# Patient Record
Sex: MALE | Race: WHITE | NOT HISPANIC OR LATINO
[De-identification: names, ages, dates, MRNs, and addresses within clinical notes are randomized per-mention and may not be internally consistent; named-entity substitution may affect disease eponyms.]

---

## 2020-07-05 ENCOUNTER — TRANSCRIPTION ENCOUNTER (OUTPATIENT)
Age: 45
End: 2020-07-05

## 2021-08-19 ENCOUNTER — EMERGENCY (EMERGENCY)
Facility: HOSPITAL | Age: 46
LOS: 1 days | Discharge: HOME | End: 2021-08-19
Attending: INTERNAL MEDICINE | Admitting: INTERNAL MEDICINE
Payer: COMMERCIAL

## 2021-08-19 VITALS
SYSTOLIC BLOOD PRESSURE: 128 MMHG | DIASTOLIC BLOOD PRESSURE: 85 MMHG | TEMPERATURE: 98 F | HEART RATE: 79 BPM | WEIGHT: 240.08 LBS | HEIGHT: 73 IN | OXYGEN SATURATION: 97 % | RESPIRATION RATE: 17 BRPM

## 2021-08-19 PROCEDURE — 99283 EMERGENCY DEPT VISIT LOW MDM: CPT | Mod: 25

## 2021-08-19 PROCEDURE — 29125 APPL SHORT ARM SPLINT STATIC: CPT

## 2021-08-19 PROCEDURE — 73140 X-RAY EXAM OF FINGER(S): CPT

## 2021-08-19 PROCEDURE — 73140 X-RAY EXAM OF FINGER(S): CPT | Mod: 26,RT

## 2021-08-19 PROCEDURE — 29125 APPL SHORT ARM SPLINT STATIC: CPT | Mod: RT

## 2021-08-19 NOTE — ED PROVIDER NOTE - CLINICAL SUMMARY MEDICAL DECISION MAKING FREE TEXT BOX
46 y/o M with no reported PMH presents to the ED with c/o right thumb pain s/p injury yesterday. pt states he grabbed a bag of rocks and hyperextended his finger. he applied ice last night which helped. he denies f/c, crush injury, paresthesias or all other complaints. XR images reviewed by Dr. Murray- no fx. pt refused pain meds. thumb spica placed for pain control. will dc with plastics f/u

## 2021-08-19 NOTE — ED PROVIDER NOTE - PHYSICAL EXAMINATION
msk: +mild TTP over the right MCP joint. no snuff box TTP. FROM of fingers. good cap refill  5/5 strength in the finger   no neurovasc compromise on exam   no swelling or obvious deformity noted

## 2021-08-19 NOTE — ED PROVIDER NOTE - ATTENDING CONTRIBUTION TO CARE
46 y/o M with no reported PMH presents to the ED with c/o right thumb pain s/p injury yesterday. pt states he grabbed a bag of rocks and hyperextended his finger. he applied ice last night which helped. he denies f/c, crush injury, paresthesias or all other complaints. XR images reviewed by Dr. Murray- no fx. pt refused pain meds. thumb spica placed for pain control. will dc with plastics f/u  Dr. Murray:  I have reviewed and discussed with the PA/ resident the case specifics, including the history, physical assessment, evaluation, conclusion, laboratory results, and medical plan. I agree with the contents, and conclusions. I have personally examined, and interviewed the patient.

## 2021-08-19 NOTE — ED PROVIDER NOTE - NSFOLLOWUPINSTRUCTIONS_ED_ALL_ED_FT
Follow up with the hand specialist within 1 week.     Rest, Ice 15 min on/ 15 min off, Elevate and keep compression of affected area.     Take Ibuprofen 600mg Orally every 6 hours as needed for pain.    Stay hydrated    Return to the ER if your symptoms worsen or for any other medical emergencies  **********      Finger Sprain    WHAT YOU NEED TO KNOW:    A finger sprain happens when ligaments in your finger or thumb are stretched or torn. Ligaments are the tough tissues that connect bones. Ligaments allow your hands to grasp and pinch.    DISCHARGE INSTRUCTIONS:    Return to the emergency department if:   •The skin on your injured finger looks bluish or pale (less color than normal).      •You have new weakness or numbness in your finger or thumb. It may tingle or burn.      •You have a splint that you cannot adjust and it feels too tight.      Call your doctor if:   •You have new or increased swelling or pain in your finger.      •You have new or increased stiffness when you move your injured finger.      •You have questions or concerns about your injury or treatment.      Medicines:   •Prescription pain medicine may be given. Ask your healthcare provider how to take this medicine safely. Some prescription pain medicines contain acetaminophen. Do not take other medicines that contain acetaminophen without talking to your healthcare provider. Too much acetaminophen may cause liver damage. Prescription pain medicine may cause constipation. Ask your healthcare provider how to prevent or treat constipation.       •Take your medicine as directed. Contact your healthcare provider if you think your medicine is not helping or if you have side effects. Tell him or her if you are allergic to any medicine. Keep a list of the medicines, vitamins, and herbs you take. Include the amounts, and when and why you take them. Bring the list or the pill bottles to follow-up visits. Carry your medicine list with you in case of an emergency.      Care for your finger:   •Rest your finger for at least 48 hours. Do not do activities that cause pain. Return to normal activities as directed.      •Apply ice on your finger to help decrease pain and swelling. Use an ice pack, or put crushed ice in a plastic bag. Cover the bag with a towel before you place it on your finger. Apply ice every hour for 15 to 20 minutes at a time. You may need to apply ice at least 4 to 8 times each day. Continue for as many days as directed.      •Elevate (raise) your finger above the level of your heart as often as you can. This will help decrease swelling and pain. You can elevate your hand by resting it on a pillow.             •Use a splint or compression as directed. Compression (tight hold) helps support your finger or thumb as it heals. Tape your injured finger to the finger beside it. Severe sprains may be treated with a splint. A splint prevents your finger from moving while it heals. Ask how long you must wear the splint or tape, and how to apply them.      •Do exercises as directed. You may be given gentle exercises to begin in a few days. Exercises can help decrease stiffness in your finger or thumb. Exercises also help decrease pain and swelling and improve the movement of your finger or thumb. Check with your healthcare provider before you return to your normal activities or sports.      Follow up with your doctor as directed: Write down any questions you may have to ask at your follow up visits.

## 2021-08-19 NOTE — ED PROVIDER NOTE - CARE PROVIDER_API CALL
Aaron Styles)  Plastic Surgery  [address] [place] [address], [address] [place] [id number]  Phone: [phone]  Fax: [phone]  Follow Up Time:

## 2021-08-19 NOTE — ED PROVIDER NOTE - PATIENT PORTAL LINK FT
You can access the FollowMyHealth Patient Portal offered by [facility] by registering at the following website: [email or web address]. By joining [employer]’s FollowMyHealth portal, you will also be able to view your health information using other applications (apps) compatible with our system.

## 2021-08-19 NOTE — ED PROVIDER NOTE - OBJECTIVE STATEMENT
46 y/o M with no reported PMH presents to the ED with c/o right thumb pain s/p injury yesterday. pt states he grabbed a bag of rocks and hyperextended his finger. he applied ice last night which helped. he denies f/c, crush injury, paresthesias or all other complaints.

## 2021-08-19 NOTE — ED ADULT NURSE NOTE - NSIMPLEMENTINTERV_GEN_ALL_ED
Implemented All Universal Safety Interventions:  [place] to call system. Call bell, personal items and telephone within reach. Instruct patient to call for assistance. Room bathroom lighting operational. Non-slip footwear when patient is off stretcher. Physically safe environment: no spills, clutter or unnecessary equipment. Stretcher in lowest position, wheels locked, appropriate side rails in place.

## 2021-11-03 ENCOUNTER — TRANSCRIPTION ENCOUNTER (OUTPATIENT)
Age: 46
End: 2021-11-03

## 2021-11-03 ENCOUNTER — INPATIENT (INPATIENT)
Facility: HOSPITAL | Age: 46
LOS: 0 days | Discharge: HOME | End: 2021-11-04
Attending: ORAL & MAXILLOFACIAL SURGERY | Admitting: ORAL & MAXILLOFACIAL SURGERY
Payer: COMMERCIAL

## 2021-11-03 VITALS
SYSTOLIC BLOOD PRESSURE: 143 MMHG | TEMPERATURE: 97 F | RESPIRATION RATE: 16 BRPM | HEIGHT: 73 IN | OXYGEN SATURATION: 100 % | DIASTOLIC BLOOD PRESSURE: 91 MMHG | HEART RATE: 79 BPM

## 2021-11-03 DIAGNOSIS — K04.7 PERIAPICAL ABSCESS WITHOUT SINUS: ICD-10-CM

## 2021-11-03 DIAGNOSIS — Z01.818 ENCOUNTER FOR OTHER PREPROCEDURAL EXAMINATION: ICD-10-CM

## 2021-11-03 DIAGNOSIS — Z91.89 OTHER SPECIFIED PERSONAL RISK FACTORS, NOT ELSEWHERE CLASSIFIED: ICD-10-CM

## 2021-11-03 LAB
ALBUMIN SERPL ELPH-MCNC: 3.9 G/DL — SIGNIFICANT CHANGE UP (ref 3.3–5)
ALP SERPL-CCNC: 72 U/L — SIGNIFICANT CHANGE UP (ref 40–120)
ALT FLD-CCNC: 20 U/L — SIGNIFICANT CHANGE UP (ref 4–41)
ANION GAP SERPL CALC-SCNC: 8 MMOL/L — SIGNIFICANT CHANGE UP (ref 7–14)
APTT BLD: 30 SEC — SIGNIFICANT CHANGE UP (ref 27–36.3)
AST SERPL-CCNC: 15 U/L — SIGNIFICANT CHANGE UP (ref 4–40)
B PERT DNA SPEC QL NAA+PROBE: SIGNIFICANT CHANGE UP
B PERT+PARAPERT DNA PNL SPEC NAA+PROBE: SIGNIFICANT CHANGE UP
BASOPHILS # BLD AUTO: 0.06 K/UL — SIGNIFICANT CHANGE UP (ref 0–0.2)
BASOPHILS NFR BLD AUTO: 0.8 % — SIGNIFICANT CHANGE UP (ref 0–2)
BILIRUB SERPL-MCNC: 0.6 MG/DL — SIGNIFICANT CHANGE UP (ref 0.2–1.2)
BLD GP AB SCN SERPL QL: NEGATIVE — SIGNIFICANT CHANGE UP
BORDETELLA PARAPERTUSSIS (RAPRVP): SIGNIFICANT CHANGE UP
BUN SERPL-MCNC: 6 MG/DL — LOW (ref 7–23)
C PNEUM DNA SPEC QL NAA+PROBE: SIGNIFICANT CHANGE UP
CALCIUM SERPL-MCNC: 9.2 MG/DL — SIGNIFICANT CHANGE UP (ref 8.4–10.5)
CHLORIDE SERPL-SCNC: 98 MMOL/L — SIGNIFICANT CHANGE UP (ref 98–107)
CO2 SERPL-SCNC: 30 MMOL/L — SIGNIFICANT CHANGE UP (ref 22–31)
CREAT SERPL-MCNC: 0.79 MG/DL — SIGNIFICANT CHANGE UP (ref 0.5–1.3)
EOSINOPHIL # BLD AUTO: 0.17 K/UL — SIGNIFICANT CHANGE UP (ref 0–0.5)
EOSINOPHIL NFR BLD AUTO: 2.3 % — SIGNIFICANT CHANGE UP (ref 0–6)
FLUAV SUBTYP SPEC NAA+PROBE: SIGNIFICANT CHANGE UP
FLUBV RNA SPEC QL NAA+PROBE: SIGNIFICANT CHANGE UP
GLUCOSE SERPL-MCNC: 108 MG/DL — HIGH (ref 70–99)
HADV DNA SPEC QL NAA+PROBE: SIGNIFICANT CHANGE UP
HCOV 229E RNA SPEC QL NAA+PROBE: SIGNIFICANT CHANGE UP
HCOV HKU1 RNA SPEC QL NAA+PROBE: SIGNIFICANT CHANGE UP
HCOV NL63 RNA SPEC QL NAA+PROBE: SIGNIFICANT CHANGE UP
HCOV OC43 RNA SPEC QL NAA+PROBE: SIGNIFICANT CHANGE UP
HCT VFR BLD CALC: 38.9 % — LOW (ref 39–50)
HGB BLD-MCNC: 13.1 G/DL — SIGNIFICANT CHANGE UP (ref 13–17)
HMPV RNA SPEC QL NAA+PROBE: SIGNIFICANT CHANGE UP
HPIV1 RNA SPEC QL NAA+PROBE: SIGNIFICANT CHANGE UP
HPIV2 RNA SPEC QL NAA+PROBE: SIGNIFICANT CHANGE UP
HPIV3 RNA SPEC QL NAA+PROBE: SIGNIFICANT CHANGE UP
HPIV4 RNA SPEC QL NAA+PROBE: SIGNIFICANT CHANGE UP
IANC: 5.74 K/UL — SIGNIFICANT CHANGE UP (ref 1.5–8.5)
IMM GRANULOCYTES NFR BLD AUTO: 0.5 % — SIGNIFICANT CHANGE UP (ref 0–1.5)
INR BLD: 1.13 RATIO — SIGNIFICANT CHANGE UP (ref 0.88–1.16)
LYMPHOCYTES # BLD AUTO: 0.78 K/UL — LOW (ref 1–3.3)
LYMPHOCYTES # BLD AUTO: 10.4 % — LOW (ref 13–44)
M PNEUMO DNA SPEC QL NAA+PROBE: SIGNIFICANT CHANGE UP
MCHC RBC-ENTMCNC: 30.4 PG — SIGNIFICANT CHANGE UP (ref 27–34)
MCHC RBC-ENTMCNC: 33.7 GM/DL — SIGNIFICANT CHANGE UP (ref 32–36)
MCV RBC AUTO: 90.3 FL — SIGNIFICANT CHANGE UP (ref 80–100)
MONOCYTES # BLD AUTO: 0.68 K/UL — SIGNIFICANT CHANGE UP (ref 0–0.9)
MONOCYTES NFR BLD AUTO: 9.1 % — SIGNIFICANT CHANGE UP (ref 2–14)
NEUTROPHILS # BLD AUTO: 5.74 K/UL — SIGNIFICANT CHANGE UP (ref 1.8–7.4)
NEUTROPHILS NFR BLD AUTO: 76.9 % — SIGNIFICANT CHANGE UP (ref 43–77)
NRBC # BLD: 0 /100 WBCS — SIGNIFICANT CHANGE UP
NRBC # FLD: 0 K/UL — SIGNIFICANT CHANGE UP
PLATELET # BLD AUTO: 176 K/UL — SIGNIFICANT CHANGE UP (ref 150–400)
POTASSIUM SERPL-MCNC: 3.8 MMOL/L — SIGNIFICANT CHANGE UP (ref 3.5–5.3)
POTASSIUM SERPL-SCNC: 3.8 MMOL/L — SIGNIFICANT CHANGE UP (ref 3.5–5.3)
PROT SERPL-MCNC: 6.7 G/DL — SIGNIFICANT CHANGE UP (ref 6–8.3)
PROTHROM AB SERPL-ACNC: 12.8 SEC — SIGNIFICANT CHANGE UP (ref 10.6–13.6)
RAPID RVP RESULT: SIGNIFICANT CHANGE UP
RBC # BLD: 4.31 M/UL — SIGNIFICANT CHANGE UP (ref 4.2–5.8)
RBC # FLD: 13.2 % — SIGNIFICANT CHANGE UP (ref 10.3–14.5)
RH IG SCN BLD-IMP: POSITIVE — SIGNIFICANT CHANGE UP
RSV RNA SPEC QL NAA+PROBE: SIGNIFICANT CHANGE UP
RV+EV RNA SPEC QL NAA+PROBE: SIGNIFICANT CHANGE UP
SARS-COV-2 RNA SPEC QL NAA+PROBE: SIGNIFICANT CHANGE UP
SODIUM SERPL-SCNC: 136 MMOL/L — SIGNIFICANT CHANGE UP (ref 135–145)
WBC # BLD: 7.47 K/UL — SIGNIFICANT CHANGE UP (ref 3.8–10.5)
WBC # FLD AUTO: 7.47 K/UL — SIGNIFICANT CHANGE UP (ref 3.8–10.5)

## 2021-11-03 PROCEDURE — 99223 1ST HOSP IP/OBS HIGH 75: CPT | Mod: GC

## 2021-11-03 PROCEDURE — 99285 EMERGENCY DEPT VISIT HI MDM: CPT

## 2021-11-03 PROCEDURE — 70487 CT MAXILLOFACIAL W/DYE: CPT | Mod: 26,MA

## 2021-11-03 RX ORDER — SODIUM CHLORIDE 9 MG/ML
1000 INJECTION, SOLUTION INTRAVENOUS
Refills: 0 | Status: DISCONTINUED | OUTPATIENT
Start: 2021-11-03 | End: 2021-11-04

## 2021-11-03 RX ORDER — OXYCODONE HYDROCHLORIDE 5 MG/1
10 TABLET ORAL EVERY 6 HOURS
Refills: 0 | Status: DISCONTINUED | OUTPATIENT
Start: 2021-11-03 | End: 2021-11-04

## 2021-11-03 RX ORDER — CHLORHEXIDINE GLUCONATE 213 G/1000ML
15 SOLUTION TOPICAL
Refills: 0 | Status: DISCONTINUED | OUTPATIENT
Start: 2021-11-03 | End: 2021-11-04

## 2021-11-03 RX ORDER — OXYCODONE HYDROCHLORIDE 5 MG/1
5 TABLET ORAL EVERY 6 HOURS
Refills: 0 | Status: DISCONTINUED | OUTPATIENT
Start: 2021-11-03 | End: 2021-11-04

## 2021-11-03 RX ORDER — SODIUM CHLORIDE 9 MG/ML
1000 INJECTION, SOLUTION INTRAVENOUS
Refills: 0 | Status: DISCONTINUED | OUTPATIENT
Start: 2021-11-03 | End: 2021-11-03

## 2021-11-03 RX ORDER — ENOXAPARIN SODIUM 100 MG/ML
40 INJECTION SUBCUTANEOUS EVERY 24 HOURS
Refills: 0 | Status: DISCONTINUED | OUTPATIENT
Start: 2021-11-03 | End: 2021-11-04

## 2021-11-03 RX ORDER — IBUPROFEN 200 MG
600 TABLET ORAL EVERY 6 HOURS
Refills: 0 | Status: DISCONTINUED | OUTPATIENT
Start: 2021-11-03 | End: 2021-11-04

## 2021-11-03 RX ORDER — AMPICILLIN SODIUM AND SULBACTAM SODIUM 250; 125 MG/ML; MG/ML
3 INJECTION, POWDER, FOR SUSPENSION INTRAMUSCULAR; INTRAVENOUS EVERY 6 HOURS
Refills: 0 | Status: DISCONTINUED | OUTPATIENT
Start: 2021-11-03 | End: 2021-11-04

## 2021-11-03 RX ORDER — ACETAMINOPHEN 500 MG
650 TABLET ORAL EVERY 6 HOURS
Refills: 0 | Status: DISCONTINUED | OUTPATIENT
Start: 2021-11-03 | End: 2021-11-04

## 2021-11-03 RX ADMIN — SODIUM CHLORIDE 75 MILLILITER(S): 9 INJECTION, SOLUTION INTRAVENOUS at 16:01

## 2021-11-03 RX ADMIN — ENOXAPARIN SODIUM 40 MILLIGRAM(S): 100 INJECTION SUBCUTANEOUS at 19:22

## 2021-11-03 RX ADMIN — CHLORHEXIDINE GLUCONATE 15 MILLILITER(S): 213 SOLUTION TOPICAL at 21:43

## 2021-11-03 RX ADMIN — OXYCODONE HYDROCHLORIDE 5 MILLIGRAM(S): 5 TABLET ORAL at 18:08

## 2021-11-03 RX ADMIN — AMPICILLIN SODIUM AND SULBACTAM SODIUM 200 GRAM(S): 250; 125 INJECTION, POWDER, FOR SUSPENSION INTRAMUSCULAR; INTRAVENOUS at 17:57

## 2021-11-03 RX ADMIN — AMPICILLIN SODIUM AND SULBACTAM SODIUM 200 GRAM(S): 250; 125 INJECTION, POWDER, FOR SUSPENSION INTRAMUSCULAR; INTRAVENOUS at 23:25

## 2021-11-03 RX ADMIN — SODIUM CHLORIDE 75 MILLILITER(S): 9 INJECTION, SOLUTION INTRAVENOUS at 19:21

## 2021-11-03 RX ADMIN — Medication 650 MILLIGRAM(S): at 21:43

## 2021-11-03 RX ADMIN — Medication 650 MILLIGRAM(S): at 23:46

## 2021-11-03 NOTE — ED PROVIDER NOTE - OBJECTIVE STATEMENT
45 yom with right lower face swelling and pain. Pt has had on/off pain in that area and was to get root canal but pain worsened over last three days. Pt seen at [place] 30 hours ago and had CT that shows "no drainable fluid collection." Pt was initially given Amox and Flagyl but changed to Zosyn in ED and then PO Augmentin. Pt taking narcotics and Motrin around the clock. No fever. Today swelling significantly worse but pain improved. No SOB, change in voice.

## 2021-11-03 NOTE — ED ADULT NURSE NOTE - CHIEF COMPLAINT QUOTE
Pt states that he has been having right sided tooth abscess for the past few days.  Pt states that he was in the ER at [employer] yesterday and was given IV abx and advised to come to this ER to see oral surgeon.  Pt states that he cannot get local anesthesia because it causes bradycardia.  Pt denies PMH

## 2021-11-03 NOTE — H&P ADULT - NSHPPHYSICALEXAM_GEN_ALL_CORE
Gen: AAox3, NAD, NC/AT  EOE: Lower R facial swelling w/ mild induration. Inferior border of mandible palpable throughout.   Intraoral Exam: MOUNIKA: ( 35mm  ) , Severe attrition, carious/ fractured distal #30. Fluctuant swelling in buccal vestibule #30 with spontaneous purulent drainage. occlusion stable and reproducible. B/l mandibular aleyda. FOM soft, NT, NE.

## 2021-11-03 NOTE — ED PROVIDER NOTE - CLINICAL SUMMARY MEDICAL DECISION MAKING FREE TEXT BOX
45 yom with dental abscess and significant facial edema concerning that increasing of antibxs that are appropriate - pt sent in to see OMFS Dr. Walls. Spoke with OMFS resident will get labs and repeat CT at their request. Pt aware to be prepped for possible OR.

## 2021-11-03 NOTE — CONSULT NOTE ADULT - ASSESSMENT
Assessment/Plan: 45yMale with R vestibular/ buccal space abscess 2/2 necrotic tooth #30. Scheduled for I&D, extraction in OR tomorrow.     - admit OMFS (Dr. Valente)  - unasyn 3g q6h   - Rec Pain Control  - Rec Peridex rinse BID x 2 weeks  - NPO midnight   - FLD   Case discussed with attending.

## 2021-11-03 NOTE — H&P ADULT - HISTORY OF PRESENT ILLNESS
Patient is a 45y old Male presents to the ED with chief complaint of right lower facial swelling that has worsened overnight. Pt reports sporadic dental pain and was to get a root canal but pain worsened over last three days and has since presented to the ED. Pt initially seen at [place] yesterday and had a CT that showed "no drainable fluid collection." Received Amox, flagyl then changed to zosyn. Presents to [facility] ED today for increase in facial swelling since yesterday ED visit. Denies f/n/v/c, SOB, chest pain. No difficulty breathing, swallowing, or tolerating own secretions.

## 2021-11-03 NOTE — ED ADULT NURSE NOTE - OBJECTIVE STATEMENT
Pt arriving to intake 10B A&XO4 ambulatory c/o R sided tooth infection. Pt seen at [facility] yesterday and received IV abx. Pt endorsing worsening swelling to R face. Airway patent. Resp even and unlabored. No drooling noted. Denies SOB. Verbalizing partial improvement in pain today. 20g IV placed in LAC. Labs drawn and sent. Awaiting CT.

## 2021-11-03 NOTE — ED PROVIDER NOTE - PHYSICAL EXAMINATION
face: significant edema over lateral face and submandibular area induration limited to lower mandible area. #30 with 2cm area of fluctuant swelling and mild erythema, tender.

## 2021-11-03 NOTE — CONSULT NOTE ADULT - SUBJECTIVE AND OBJECTIVE BOX
PAPITO CADE  45y  Male      Patient is a 45y old  Male who presents with a chief complaint of dental pain    46 yo M with no PMH presenting for acute worsening of 2 month long continued right lower mandibular pain 2/2 dental infection with associated swelling now being planned for OMFS surgery tomorrow.   Patient currently has no chest pain, has been very active with moderate exercise daily greater than 30 min in an attempt to lose weight and prepare for the incoming hockey season and has no difficulty walking up two flight of stairs. Denies SOB, GALE, palpitations, lightheadedness, history of blood clots, history of TIA/CVA/CAD or family history of early cardiac disease.  Patient does not take any standing medications prior to recent antibiotics and pain regimen.     Patient notes that he has an intolerance of lidocaine, novocaine both IV, IM and intraocular which causes him to become bradycardic and syncopize, which usually resolves on his own. Patient denies any rash, swelling or throat closing. Patient has obtained prior EKGs, ECHOs to evaluate this with a cardiologist who is also his PCP with normal results. Patient claims he has some level of vasovagal discovered after tilt table test. Patient does not have diabetes and has quit chewing tobacco. As an outpatient, patient usually takes valium elixir for local anesthesia and has underwent some procedures under general anesthesia without complications.     Pain is currently controlled      PAST MEDICAL & SURGICAL HISTORY:  No pertinent past medical history    No significant past surgical history: appendectomy, ORIF of left leg        REVIEW OF SYSTEMS:  CONSTITUTIONAL: No fever, weight loss, or fatigue  EYES: No eye pain, visual disturbances, or discharge  ENMT:  No difficulty hearing, tinnitus, vertigo; No sinus or throat pain  NECK: No pain or stiffness  BREASTS: No pain, masses, or nipple discharge  RESPIRATORY: No cough, wheezing, chills or hemoptysis; No shortness of breath  CARDIOVASCULAR: No chest pain, palpitations, dizziness, or leg swelling  GASTROINTESTINAL: No abdominal or epigastric pain. No nausea, vomiting, or hematemesis; No diarrhea or constipation. No melena or hematochezia.  GENITOURINARY: No dysuria, frequency, hematuria, or incontinence  NEUROLOGICAL: No headaches, memory loss, loss of strength, numbness, or tremors  SKIN: No itching, burning, rashes, or lesions   LYMPH NODES: No enlarged glands  ENDOCRINE: No heat or cold intolerance; No hair loss  MUSCULOSKELETAL: No joint pain or swelling; No muscle, back, or extremity pain  PSYCHIATRIC: No depression, anxiety, mood swings, or difficulty sleeping  HEME/LYMPH: No easy bruising, or bleeding gums  ALLERY AND IMMUNOLOGIC: No hives or eczema    T(C): 36.9 (11-03-21 @ 13:50), Max: 36.9 (11-03-21 @ 13:50)  HR: 78 (11-03-21 @ 13:50) (78 - 79)  BP: 132/70 (11-03-21 @ 13:50) (132/70 - 143/91)  RR: 17 (11-03-21 @ 13:50) (16 - 17)  SpO2: 100% (11-03-21 @ 13:50) (100% - 100%)  Wt(kg): --Vital Signs Last 24 Hrs  T(C): 36.9 (03 Nov 2021 13:50), Max: 36.9 (03 Nov 2021 13:50)  T(F): 98.5 (03 Nov 2021 13:50), Max: 98.5 (03 Nov 2021 13:50)  HR: 78 (03 Nov 2021 13:50) (78 - 79)  BP: 132/70 (03 Nov 2021 13:50) (132/70 - 143/91)  BP(mean): --  RR: 17 (03 Nov 2021 13:50) (16 - 17)  SpO2: 100% (03 Nov 2021 13:50) (100% - 100%)    PHYSICAL EXAM:  GENERAL: NAD, well-groomed, well-developed  HEAD:  Atraumatic, Normocephalic  EYES: EOMI, PERRLA, conjunctiva and sclera clear  ENMT: No tonsillar erythema, exudates, or enlargement; Moist mucous membranes, Good dentition, No lesions, right lower mandible swollen and tender with erythema   NECK: Supple, No JVD, Normal thyroid  NERVOUS SYSTEM:  Alert & Oriented X3, Good concentration; Motor Strength 5/5 B/L upper and lower extremities; DTRs 2+ intact and symmetric  CHEST/LUNG: Clear to percussion bilaterally; No rales, rhonchi, wheezing, or rubs  HEART: Regular rate and rhythm; No murmurs, rubs, or gallops  ABDOMEN: Soft, Nontender, Nondistended; Bowel sounds present  EXTREMITIES:  2+ Peripheral Pulses, No clubbing, cyanosis. left leg chronically more swollen than right due to past surgery  LYMPH: No lymphadenopathy noted  SKIN: No rashes or lesions    Consultant(s) Notes Reviewed:  [x ] YES  [ ] NO  Care Discussed with Consultants/Other Providers [ x] YES  [ ] NO    LABS:  CBC   11-03-21 @ 11:23  Hematcorit 38.9  Hemoglobin 13.1  Mean Cell Hemoglobin 30.4  Platelet Count-Automated 176  RBC Count 4.31  Red Cell Distrib Width 13.2  Wbc Count 7.47      BMP  11-03-21 @ 11:23  Anion Gap. Serum 8  Blood Urea Nitrogen,Serm 6  Calcium, Total Serum 9.2  Carbon Dioxide, Serum 30  Chloride, Serum 98  Creatinine, Serum 0.79  eGFR in  124  eGFR in Non Afican American 107  Gloucose, serum 108  Potassium, Serum 3.8  Sodium, Serum 136                  CMP  11-03-21 @ 11:23  Radha Aminotransferase(ALT/SGPT)20  Albumin, Serum 3.9  Alkaline Phosphatase, Serum 72  Anion Gap, Serum 8  Aspartate Aminotransferase (AST/SGOT)15  Bilirubin Total, Serum 0.6  Blood Urea Nitrogen, Serum 6  Calcium,Total Serum 9.2  Carbon Dioxide, Serum 30  Chloride, Serum 98  Creatinine, Serum 0.79  eGFR if  124  eGFR if Non African American 107  Glucose, Serum 108  Potassium, Serum 3.8  Protein Total, Serum 6.7  Sodium, Serum 136                          PT/INR  PT/INR  11-03-21 @ 11:23  INR 1.13  Prothrombin Time Comment --  Prothrobin Time, [id number]      Amylase/Lipase            RADIOLOGY & ADDITIONAL TESTS:    Imaging Personally Reviewed:  [ ] YES  [ ] NO PAPITO CADE  45y  Male      Patient is a 45y old  Male who presents with a chief complaint of dental pain    44 yo M with no PMH presenting for acute worsening of 2 month long continued right lower mandibular pain 2/2 dental infection with associated swelling now being planned for OMFS surgery tomorrow.   Patient currently has no chest pain, has been very active with moderate exercise daily greater than 30 min in an attempt to lose weight and prepare for the incoming hockey season and has no difficulty walking up two flight of stairs. Denies SOB, GALE, palpitations, lightheadedness, history of blood clots, history of TIA/CVA/CAD or family history of early cardiac disease.  Patient does not take any standing medications prior to recent antibiotics and pain regimen.     Patient notes that he has an intolerance of lidocaine, novocaine both IV, IM and intraocular which causes him to become bradycardic and syncopize, which usually resolves on his own. Patient denies any rash, swelling or throat closing. Patient has obtained prior EKGs, ECHOs to evaluate this with a cardiologist who is also his PCP with normal results. Patient claims he has some level of vasovagal discovered after tilt table test. Patient does not have diabetes and has quit chewing tobacco. As an outpatient, patient usually takes valium elixir for local anesthesia and has underwent some procedures under general anesthesia without complications.     Pain is currently controlled      PAST MEDICAL & SURGICAL HISTORY:  No pertinent past medical history    No significant past surgical history: appendectomy, ORIF of left leg        REVIEW OF SYSTEMS:  CONSTITUTIONAL: No fever, weight loss, or fatigue  EYES: No eye pain, visual disturbances, or discharge  ENMT:  No difficulty hearing, tinnitus, vertigo; No sinus or throat pain  NECK: No pain or stiffness  BREASTS: No pain, masses, or nipple discharge  RESPIRATORY: No cough, wheezing, chills or hemoptysis; No shortness of breath  CARDIOVASCULAR: No chest pain, palpitations, dizziness, or leg swelling  GASTROINTESTINAL: No abdominal or epigastric pain. No nausea, vomiting, or hematemesis; No diarrhea or constipation. No melena or hematochezia.  GENITOURINARY: No dysuria, frequency, hematuria, or incontinence  NEUROLOGICAL: No headaches, memory loss, loss of strength, numbness, or tremors  SKIN: No itching, burning, rashes, or lesions   LYMPH NODES: No enlarged glands  ENDOCRINE: No heat or cold intolerance; No hair loss  MUSCULOSKELETAL: No joint pain or swelling; No muscle, back, or extremity pain  PSYCHIATRIC: No depression, anxiety, mood swings, or difficulty sleeping  HEME/LYMPH: No easy bruising, or bleeding gums  ALLERY AND IMMUNOLOGIC: No hives or eczema    T(C): 36.9 (11-03-21 @ 13:50), Max: 36.9 (11-03-21 @ 13:50)  HR: 78 (11-03-21 @ 13:50) (78 - 79)  BP: 132/70 (11-03-21 @ 13:50) (132/70 - 143/91)  RR: 17 (11-03-21 @ 13:50) (16 - 17)  SpO2: 100% (11-03-21 @ 13:50) (100% - 100%)  Wt(kg): --Vital Signs Last 24 Hrs  T(C): 36.9 (03 Nov 2021 13:50), Max: 36.9 (03 Nov 2021 13:50)  T(F): 98.5 (03 Nov 2021 13:50), Max: 98.5 (03 Nov 2021 13:50)  HR: 78 (03 Nov 2021 13:50) (78 - 79)  BP: 132/70 (03 Nov 2021 13:50) (132/70 - 143/91)  BP(mean): --  RR: 17 (03 Nov 2021 13:50) (16 - 17)  SpO2: 100% (03 Nov 2021 13:50) (100% - 100%)    PHYSICAL EXAM:  GENERAL: NAD, well-groomed, well-developed  HEAD:  Atraumatic, Normocephalic  EYES: EOMI, PERRLA, conjunctiva and sclera clear  ENMT: No tonsillar erythema, exudates, or enlargement; Moist mucous membranes, Good dentition, No lesions, right lower mandible swollen and tender with erythema   NECK: Supple, No JVD, Normal thyroid  NERVOUS SYSTEM:  Alert & Oriented X3, Good concentration; Motor Strength 5/5 B/L upper and lower extremities; DTRs 2+ intact and symmetric  CHEST/LUNG: Clear to percussion bilaterally; No rales, rhonchi, wheezing, or rubs  HEART: Regular rate and rhythm; No murmurs, rubs, or gallops  ABDOMEN: Soft, Nontender, Nondistended; Bowel sounds present  EXTREMITIES:  2+ Peripheral Pulses, No clubbing, cyanosis. left leg chronically more swollen than right due to past surgery  LYMPH: No lymphadenopathy noted  SKIN: No rashes or lesions    Consultant(s) Notes Reviewed:  [x ] YES  [ ] NO  Care Discussed with Consultants/Other Providers [ x] YES  [ ] NO    LABS:  CBC   11-03-21 @ 11:23  Hematcorit 38.9  Hemoglobin 13.1  Mean Cell Hemoglobin 30.4  Platelet Count-Automated 176  RBC Count 4.31  Red Cell Distrib Width 13.2  Wbc Count 7.47      BMP  11-03-21 @ 11:23  Anion Gap. Serum 8  Blood Urea Nitrogen,Serm 6  Calcium, Total Serum 9.2  Carbon Dioxide, Serum 30  Chloride, Serum 98  Creatinine, Serum 0.79  eGFR in  124  eGFR in Non Afican American 107  Gloucose, serum 108  Potassium, Serum 3.8  Sodium, Serum 136                  CMP  11-03-21 @ 11:23  Radha Aminotransferase(ALT/SGPT)20  Albumin, Serum 3.9  Alkaline Phosphatase, Serum 72  Anion Gap, Serum 8  Aspartate Aminotransferase (AST/SGOT)15  Bilirubin Total, Serum 0.6  Blood Urea Nitrogen, Serum 6  Calcium,Total Serum 9.2  Carbon Dioxide, Serum 30  Chloride, Serum 98  Creatinine, Serum 0.79  eGFR if  124  eGFR if Non African American 107  Glucose, Serum 108  Potassium, Serum 3.8  Protein Total, Serum 6.7  Sodium, Serum 136                          PT/INR  PT/INR  11-03-21 @ 11:23  INR 1.13  Prothrombin Time Comment --  Prothrobin Time, [id number]      Amylase/Lipase            RADIOLOGY & ADDITIONAL TESTS:    Imaging Personally Reviewed:  [x] YES  [ ] NO    < from: CT Maxillofacial w/ IV Cont (11.03.21 @ 13:26) >  EXAM:  CT MAXILLOFACIAL  IC        PROCEDURE DATE:  Nov  3 2021         INTERPRETATION:  .    CLINICAL INFORMATION: Right-sided dental infection.    TECHNIQUE: Axial CT images were obtained through the paranasal sinuses, and orbits. 90 cc's of IV Omnipaque-350  was administered without immediate complication and 10 cc's was discarded. Coronal and sagittal reconstruction images were also obtained.    COMPARISON: None available.    FINDINGS: There is a carious right-sided first mandibular molar tooth with the cavity affecting the posterior aspect of the crown. An additional carious right-sided maxillary first molar tooth is also seen within the cavity affecting the anterior aspect of the crown. There is adjacent right-sided perimandibular soft tissue swelling, edema, and hyperenhancement with reticulation of the subcutaneous fat. There is adjacent gingival inflammation as well. There is associated myositis involving the right masseter and buccinator muscles as well as the platysma muscle.No well-formed drainable collection is seen to suggest abscess formation. There is no floor of mouth abscess. There is no overt evidence of mandibular osteomyelitis. There is an additional carious left-sided first mandibular molar tooth with nonspecific surrounding periapical radiolucency about the roots. Multiple bulky lingual and buccal surface mandibular aleyda are notable.    Multiple enlarged bilateral cervical lymph nodes are seen which are reactive.    There is fatty infiltration of the bilateral parotid glands. The submandibular glands unremarkable. The partially imaged thyroid gland appears within normal limits.    The neck vessels are patent.    The bilateral orbits inclusive of the globes, extraocular muscles, optic nerves, and orbital fat appear within normal limits.    Minor scattered mucosal thickening is seen throughout the ethmoid sinuses. Otherwise, the paranasal sinuses and mastoid air cells are clear.    The posterior arch of C1 remains unfused.    The imaged intracranialstructures appear grossly unremarkable.    IMPRESSION: Carious right-sided first mandibular and maxillary molar teeth with associated right-sided perimandibular soft tissue swelling and edema most compatible with cellulitis/phlegmon formation. No well-formed drainable collection to suggest abscess formation.    Additional carious left-sided mandibular first molar tooth without surrounding inflammatory changes.    Reactive bilateral cervical lymphadenopathy.    < end of copied text >    EKG : NSR rate 79

## 2021-11-03 NOTE — ED ADULT TRIAGE NOTE - CHIEF COMPLAINT QUOTE
Pt states that he has been having right sided tooth abscess for the past few days.  Pt states that he was in the ER at [place] yesterday and was given IV abx and advised to come to this ER to see oral surgeon.  Pt states that he cannot get local anesthesia because it causes bradycardia.  Pt denies PMH

## 2021-11-03 NOTE — CONSULT NOTE ADULT - SUBJECTIVE AND OBJECTIVE BOX
FS Consult Note   #[id number]     Patient is a 45y old Male presents to the ED with chief complaint of right lower facial swelling that has worsened overnight. Pt reports sporadic dental pain and was to get a root canal but pain worsened over last three days and has since presented to the ED. Pt initially seen at [facility] yesterday and had a CT that showed "no drainable fluid collection." Received Amox, flagyl then changed to zosyn. Presents to [facility] ED today for increase in facial swelling since yesterday ED visit. Denies f/n/v/c, SPOB, chest pain. No difficulty breathing, swallowing, or tolerating own secretions.       PAST MEDICAL & SURGICAL HISTORY:  No pertinent past medical history    No significant past surgical history      MEDICATIONS  (STANDING):  ampicillin/sulbactam  IVPB 3 Gram(s) IV Intermittent every 6 hours  chlorhexidine 0.12% Liquid 15 milliLiter(s) Oral Mucosa two times a day  dextrose 5% + lactated ringers. 1000 milliLiter(s) (75 mL/Hr) IV Continuous <Continuous>  enoxaparin Injectable 40 milliGRAM(s) SubCutaneous every 24 hours    MEDICATIONS  (PRN):  acetaminophen    Suspension .. 650 milliGRAM(s) Oral every 6 hours PRN Mild Pain (1 - 3)  ibuprofen  Suspension. 600 milliGRAM(s) Oral every 6 hours PRN Mild Pain (1 - 3)  oxyCODONE    Solution 5 milliGRAM(s) Oral every 6 hours PRN Moderate Pain (4 - 6)  oxyCODONE    Solution 10 milliGRAM(s) Oral every 6 hours PRN Severe Pain (7 - 10)    Allergies    lidocaine (Other)    Intolerances      FAMILY HISTORY:    *SOCIAL HISTORY: Denies ETOH use, Tobacco, drugs      Vital Signs Last 24 Hrs  T(C): 36.9 (03 Nov 2021 13:50), Max: 36.9 (03 Nov 2021 13:50)  T(F): 98.5 (03 Nov 2021 13:50), Max: 98.5 (03 Nov 2021 13:50)  HR: 78 (03 Nov 2021 13:50) (78 - 79)  BP: 132/70 (03 Nov 2021 13:50) (132/70 - 143/91)  BP(mean): --  RR: 17 (03 Nov 2021 13:50) (16 - 17)  SpO2: 100% (03 Nov 2021 13:50) (100% - 100%)    Physical Exam:  Gen: AAox3, NAD, NC/AT  EOE: Lower R facial swelling w/ mild induration. Inferior border of mandible palpable throughout.   Intraoral Exam: MOUNIKA: ( 35mm  ) , Severe attrition, carious/ fractured distal #30. Fluctuant swelling in buccal vestibule #30 with spontaneous purulent drainage. occlusion stable and reproducible. B/l mandibular aleyda. FOM soft, NT, NE.     LABS:                        13.1   7.47  )-----------( 176      ( 03 Nov 2021 11:23 )             38.9     11-03    136  |  98  |  6<L>  ----------------------------<  108<H>  3.8   |  30  |  0.79    Ca    9.2      03 Nov 2021 11:23    TPro  6.7  /  Alb  3.9  /  TBili  0.6  /  DBili  x   /  AST  15  /  ALT  20  /  AlkPhos  72  11-03      PT/INR - ( 03 Nov 2021 11:23 )   PT: 12.8 sec;   INR: 1.13 ratio         PTT - ( 03 Nov 2021 11:23 )  PTT:30.0 sec        CT Maxillofacial:   FINDINGS: There is a carious right-sided first mandibular molar tooth with the cavity affecting the posterior aspect of the crown. An additional carious right-sided maxillary first molar tooth is also seen within the cavity affecting the anterior aspect of the crown. There is adjacent right-sided perimandibular soft tissue swelling, edema, and hyperenhancement with reticulation of the subcutaneous fat. There is adjacent gingival inflammation as well. There is associated myositis involving the right masseter and buccinator muscles as well as the platysma muscle. No well-formed drainable collection is seen to suggest abscess formation. There is no floor of mouth abscess. There is no overt evidence of mandibular osteomyelitis. There is an additional carious left-sided first mandibular molar tooth with nonspecific surrounding periapical radiolucency about the roots. Multiple bulky lingual and buccal surface mandibular aleyda are notable.    Multiple enlarged bilateral cervical lymph nodes are seen which are reactive.    There is fatty infiltration of the bilateral parotid glands. The submandibular glands unremarkable. The partially imaged thyroid gland appears within normal limits.    The neck vessels are patent.    The bilateral orbits inclusive of the globes, extraocular muscles, optic nerves, and orbital fat appear within normal limits.    Minor scattered mucosal thickening is seen throughout the ethmoid sinuses. Otherwise, the paranasal sinuses and mastoid air cells are clear.    The posterior arch of C1 remains unfused.    The imaged intracranial structures appear grossly unremarkable.    IMPRESSION: Carious right-sided first mandibular and maxillary molar teeth with associated right-sided perimandibular soft tissue swelling and edema most compatible with cellulitis/phlegmon formation. No well-formed drainable collection to suggest abscess formation.    Additional carious left-sided mandibular first molar tooth without surrounding inflammatory changes.    Reactive bilateral cervical lymphadenopathy.

## 2021-11-03 NOTE — ED PROVIDER NOTE - RESPIRATORY, MLM
Breath sounds clear and equal bilaterally. [No Acute Distress] : no acute distress [Alert] : alert [Normocephalic] : normocephalic [EOMI] : EOMI [Clear TM bilaterally] : clear tympanic membranes bilaterally [Pink Nasal Mucosa] : pink nasal mucosa [Erythematous Oropharynx] : erythematous oropharynx [Supple] : supple [Clear to Ausculatation Bilaterally] : clear to auscultation bilaterally [Regular Rate and Rhythm] : regular rate and rhythm [Soft] : soft [NonTender] : non tender [No Abnormal Lymph Nodes Palpated] : no abnormal lymph nodes palpated [Moves All Extremities x 4] : moves all extremities x4 [Normotonic] : normotonic [Warm] : warm

## 2021-11-03 NOTE — CONSULT NOTE ADULT - ASSESSMENT
44 yo M with no PMH presenting for acute worsening of 2 month long continued right lower mandibular pain 2/2 dental infection with associated swelling now being planned for OMFS surgery tomorrow. Medicine consulted for pre op risk stratification and medical optimization    #risk stratification  RCRI score  46 yo M with no PMH presenting for acute worsening of 2 month long continued right lower mandibular pain 2/2 dental infection with associated swelling now being planned for OMFS surgery tomorrow. Medicine consulted for pre op risk stratification and medical optimization    #risk stratification  patient requiring urgent procedure, no active CP or signs of ischemia on EKG, can tolerate greater than METS>10, euvolemic, not warranting further preop evaluation  RCRI score 0  with regards to anesthesia should avoid any -kaitlynn medications given intolerance and bradycardia even to topical anesthtic dosing however will defer to anesthesia on day of operation  continue IV abx with broad coverage will defer to OMFS  pain control per primary team    thank you for the consult

## 2021-11-03 NOTE — H&P ADULT - ASSESSMENT
45yMale with R vestibular/ buccal space abscess 2/2 necrotic tooth #30. Scheduled for I&D, extraction in OR tomorrow.

## 2021-11-03 NOTE — CONSULT NOTE ADULT - ATTENDING COMMENTS
45M with no cardiac history presented for dental abscess requiring surgical drainage.     Patient has a perculiar history of adverse reaction to -James meds which resulted in syncopal episodes. Patient's history of his reaction appears more related to vasovagal, with questionable psychosomatic etiology.    his EKG with nsr. non-specific TWI in lead III. patient states he had eval with is cardiology/PCP and had negative cardiac workup in the past. He has no acute chest pain and denied exertional dyspnea, cp or syncope.     patient is low risk for low risk procedure. no medical contraindication for surgery. avoid lidocaine related analgesics.

## 2021-11-04 ENCOUNTER — TRANSCRIPTION ENCOUNTER (OUTPATIENT)
Age: 46
End: 2021-11-04

## 2021-11-04 ENCOUNTER — RESULT REVIEW (OUTPATIENT)
Age: 46
End: 2021-11-04

## 2021-11-04 VITALS
RESPIRATION RATE: 16 BRPM | TEMPERATURE: 99 F | OXYGEN SATURATION: 99 % | HEART RATE: 79 BPM | SYSTOLIC BLOOD PRESSURE: 127 MMHG | DIASTOLIC BLOOD PRESSURE: 91 MMHG

## 2021-11-04 LAB
GRAM STN FLD: SIGNIFICANT CHANGE UP
SPECIMEN SOURCE: SIGNIFICANT CHANGE UP

## 2021-11-04 PROCEDURE — 88305 TISSUE EXAM BY PATHOLOGIST: CPT | Mod: 26

## 2021-11-04 RX ORDER — HYDROMORPHONE HYDROCHLORIDE 2 MG/ML
1 INJECTION INTRAMUSCULAR; INTRAVENOUS; SUBCUTANEOUS
Refills: 0 | Status: DISCONTINUED | OUTPATIENT
Start: 2021-11-04 | End: 2021-11-04

## 2021-11-04 RX ORDER — CHLORHEXIDINE GLUCONATE 213 G/1000ML
15 SOLUTION TOPICAL
Qty: 300 | Refills: 0
Start: 2021-11-04 | End: 2021-11-13

## 2021-11-04 RX ORDER — HYDROMORPHONE HYDROCHLORIDE 2 MG/ML
0.5 INJECTION INTRAMUSCULAR; INTRAVENOUS; SUBCUTANEOUS
Refills: 0 | Status: DISCONTINUED | OUTPATIENT
Start: 2021-11-04 | End: 2021-11-04

## 2021-11-04 RX ORDER — ONDANSETRON 8 MG/1
4 TABLET, FILM COATED ORAL ONCE
Refills: 0 | Status: DISCONTINUED | OUTPATIENT
Start: 2021-11-04 | End: 2021-11-04

## 2021-11-04 RX ORDER — OXYCODONE HYDROCHLORIDE 5 MG/1
1 TABLET ORAL
Qty: 12 | Refills: 0
Start: 2021-11-04 | End: 2021-11-07

## 2021-11-04 RX ADMIN — HYDROMORPHONE HYDROCHLORIDE 1 MILLIGRAM(S): 2 INJECTION INTRAMUSCULAR; INTRAVENOUS; SUBCUTANEOUS at 12:16

## 2021-11-04 RX ADMIN — CHLORHEXIDINE GLUCONATE 15 MILLILITER(S): 213 SOLUTION TOPICAL at 05:52

## 2021-11-04 RX ADMIN — HYDROMORPHONE HYDROCHLORIDE 1 MILLIGRAM(S): 2 INJECTION INTRAMUSCULAR; INTRAVENOUS; SUBCUTANEOUS at 12:27

## 2021-11-04 RX ADMIN — AMPICILLIN SODIUM AND SULBACTAM SODIUM 200 GRAM(S): 250; 125 INJECTION, POWDER, FOR SUSPENSION INTRAMUSCULAR; INTRAVENOUS at 12:18

## 2021-11-04 RX ADMIN — OXYCODONE HYDROCHLORIDE 10 MILLIGRAM(S): 5 TABLET ORAL at 12:31

## 2021-11-04 RX ADMIN — AMPICILLIN SODIUM AND SULBACTAM SODIUM 200 GRAM(S): 250; 125 INJECTION, POWDER, FOR SUSPENSION INTRAMUSCULAR; INTRAVENOUS at 05:52

## 2021-11-04 RX ADMIN — HYDROMORPHONE HYDROCHLORIDE 1 MILLIGRAM(S): 2 INJECTION INTRAMUSCULAR; INTRAVENOUS; SUBCUTANEOUS at 11:51

## 2021-11-04 NOTE — DISCHARGE NOTE PROVIDER - NSDCCPTREATMENT_GEN_ALL_CORE_FT
PRINCIPAL PROCEDURE  Procedure: Incision and drainage, mandible  Findings and Treatment:       SECONDARY PROCEDURE  Procedure: Surgical removal of tooth  Findings and Treatment:

## 2021-11-04 NOTE — DISCHARGE NOTE NURSING/CASE MANAGEMENT/SOCIAL WORK - NSDCPNINST_GEN_ALL_CORE
Because you had anesthesia for the next 24 hours you should not drive a car, operate power tools or machinery, drink alcohol beer, or wine. Do not make any important personal or business decisions. You may experience lightheadedness, dizziness or sleepiness following the procedure a responsible adult should stay with you for 24 hours. Call your doctor for any bleeding that does not stop, pain not relieved by medications, fever greater than 101 F, signs symptoms of infection redness, swellling or foul smelling discharge. In the event that you develop a complication and you are unable to reach your own doctor you may contact [facility] [phone] M-F 6A-11P, [facility] [phone] 24/7 or [facility] [phone] 24/7. Do not drive if taking pain medications. No heavy lifting.  Because you had anesthesia for the next 24 hours you should not drive a car, operate power tools or machinery, drink alcohol beer, or wine. Do not make any important personal or business decisions. You may experience lightheadedness, dizziness or sleepiness following the procedure a responsible adult should stay with you for 24 hours. Call your doctor for any bleeding that does not stop, pain not relieved by medications, fever greater than 101 F, signs symptoms of infection redness, swellling or foul smelling discharge. In the event that you develop a complication and you are unable to reach your own doctor you may contact [facility] [phone] M-F 6A-11P, [facility] [phone] 24/7 or [facility] [phone] 24/7. Do not drive if taking pain medications. No heavy lifting.   no tylenol or tylenol containing products till 4:45 pm   no motrin till 5:30 pm

## 2021-11-04 NOTE — PROGRESS NOTE ADULT - SUBJECTIVE AND OBJECTIVE BOX
OMFS Progress Note   #[id number]     STATUS POST:      POST OPERATIVE DAY #      INTERVAL EVENTS/SUBJECTIVE:     ______________________________________________  OBJECTIVE:   T(C): 36.7 (11-04-21 @ 05:53), Max: 37.1 (11-03-21 @ 20:07)  HR: 99 (11-04-21 @ 05:53) (78 - 99)  BP: 123/73 (11-04-21 @ 05:53) (123/73 - 144/82)  RR: 18 (11-04-21 @ 05:53) (16 - 18)  SpO2: 99% (11-04-21 @ 05:53) (97% - 100%)  Wt(kg): --  CAPILLARY BLOOD GLUCOSE        I&O's Detail      Physical exam:  Gen: NAD  Abdomen:  Vascular:  ______________________________________________  LABS:  CBC Full  -  ( 03 Nov 2021 11:23 )  WBC Count : 7.47 K/uL  RBC Count : 4.31 M/uL  Hemoglobin : 13.1 g/dL  Hematocrit : 38.9 %  Platelet Count - Automated : 176 K/uL  Mean Cell Volume : 90.3 fL  Mean Cell Hemoglobin : 30.4 pg  Mean Cell Hemoglobin Concentration : 33.7 gm/dL  Auto Neutrophil # : 5.74 K/uL  Auto Lymphocyte # : 0.78 K/uL  Auto Monocyte # : 0.68 K/uL  Auto Eosinophil # : 0.17 K/uL  Auto Basophil # : 0.06 K/uL  Auto Neutrophil % : 76.9 %  Auto Lymphocyte % : 10.4 %  Auto Monocyte % : 9.1 %  Auto Eosinophil % : 2.3 %  Auto Basophil % : 0.8 %    11-03    136  |  98  |  6<L>  ----------------------------<  108<H>  3.8   |  30  |  0.79    Ca    9.2      03 Nov 2021 11:23    TPro  6.7  /  Alb  3.9  /  TBili  0.6  /  DBili  x   /  AST  15  /  ALT  20  /  AlkPhos  72  11-03    _____________________________________________  RADIOLOGY:

## 2021-11-04 NOTE — DISCHARGE NOTE PROVIDER - HOSPITAL COURSE
45M with history of lidocaine hypersensitivity presents to [facility] with significant right facial swelling related to tooth #30 with ongoing pain for approximately 1 week. No dysphagia, dysphonia related to facial swelling. Hemodynamically stable and satting well on room air. Patient was taken to the OR on 11/4/21 with Dr. Valente for incision and drainage of right buccal vestibule abscess, culture and sensitivity of expressed fluid, surgical removal of tooth #30, and histologic analysis of buccal soft tissue specimen. Patient to be discharged to home on oral antibiotics, Peridex, and pain medication. Patient to follow up at [facility] Oral [facility] Maxillofacial surgery clinic in 1 week with Dr. Valente.

## 2021-11-04 NOTE — DISCHARGE NOTE PROVIDER - CARE PROVIDER_API CALL
Chalo Valente (DDS; MD)  OralMaxillofacial Surgery  [address] [place] [address]  Phone: [phone]  Fax: [phone]  Follow Up Time: 1 week

## 2021-11-04 NOTE — PRE-OP CHECKLIST - AS BP NONINV SITE
Pt listed in observation status; scheduled to discharge home today. Pt to be transported home by spouse.   PCP appt made for May 7th, 2019 @ 10:15 am. 
 
SUKHI Sommer 
 left upper arm

## 2021-11-04 NOTE — DISCHARGE NOTE PROVIDER - NSDCMRMEDTOKEN_GEN_ALL_CORE_FT
amoxicillin-clavulanate 875 mg-125 mg oral tablet: 1 milligram(s) orally every 12 hours MDD:2 tablets  oxyCODONE 5 mg oral tablet: 1 tab(s) orally every 8 hours MDD:4 tabs  Peridex 0.12% mucous membrane liquid: 15 milliliter(s) orally 2 times a day MDD:30 mL

## 2021-11-04 NOTE — DISCHARGE NOTE NURSING/CASE MANAGEMENT/SOCIAL WORK - PATIENT PORTAL LINK FT
You can access the FollowMyHealth Patient Portal offered by [facility] by registering at the following website: [email or web address]. By joining [employer]’s FollowMyHealth portal, you will also be able to view your health information using other applications (apps) compatible with our system.

## 2021-11-04 NOTE — DISCHARGE NOTE PROVIDER - NSDCFUADDINST_GEN_ALL_CORE_FT
Please follow up with Dr. Valente at [facility] at the Department of Dental Medicine in 1 week. Take you prescribed medications as instructed.

## 2021-11-08 LAB — SURGICAL PATHOLOGY STUDY: SIGNIFICANT CHANGE UP

## 2021-11-09 LAB
CULTURE RESULTS: SIGNIFICANT CHANGE UP
SPECIMEN SOURCE: SIGNIFICANT CHANGE UP

## 2021-12-21 ENCOUNTER — TRANSCRIPTION ENCOUNTER (OUTPATIENT)
Age: 46
End: 2021-12-21

## 2021-12-21 ENCOUNTER — OUTPATIENT (OUTPATIENT)
Dept: OUTPATIENT SERVICES | Facility: HOSPITAL | Age: 46
LOS: 1 days | End: 2021-12-21
Payer: COMMERCIAL

## 2021-12-21 DIAGNOSIS — Z20.828 CONTACT WITH AND (SUSPECTED) EXPOSURE TO OTHER VIRAL COMMUNICABLE DISEASES: ICD-10-CM

## 2021-12-21 LAB — SARS-COV-2 RNA SPEC QL NAA+PROBE: SIGNIFICANT CHANGE UP

## 2021-12-21 PROCEDURE — U0005: CPT

## 2021-12-21 PROCEDURE — U0003: CPT

## 2021-12-22 ENCOUNTER — OUTPATIENT (OUTPATIENT)
Dept: OUTPATIENT SERVICES | Facility: HOSPITAL | Age: 46
LOS: 1 days | End: 2021-12-22
Payer: COMMERCIAL

## 2021-12-22 DIAGNOSIS — Z12.11 ENCOUNTER FOR SCREENING FOR MALIGNANT NEOPLASM OF COLON: ICD-10-CM

## 2021-12-22 PROCEDURE — 45378 DIAGNOSTIC COLONOSCOPY: CPT

## 2021-12-22 RX ORDER — SODIUM CHLORIDE 9 MG/ML
500 INJECTION INTRAMUSCULAR; INTRAVENOUS; SUBCUTANEOUS
Refills: 0 | Status: DISCONTINUED | OUTPATIENT
Start: 2021-12-22 | End: 2022-01-06

## 2022-06-06 ENCOUNTER — EMERGENCY (EMERGENCY)
Facility: HOSPITAL | Age: 47
LOS: 1 days | Discharge: HOME | End: 2022-06-06
Attending: EMERGENCY MEDICINE | Admitting: EMERGENCY MEDICINE
Payer: COMMERCIAL

## 2022-06-06 VITALS
RESPIRATION RATE: 15 BRPM | OXYGEN SATURATION: 99 % | DIASTOLIC BLOOD PRESSURE: 76 MMHG | SYSTOLIC BLOOD PRESSURE: 117 MMHG | HEART RATE: 90 BPM

## 2022-06-06 VITALS
OXYGEN SATURATION: 97 % | HEART RATE: 96 BPM | HEIGHT: 73 IN | TEMPERATURE: 98 F | WEIGHT: 229.94 LBS | RESPIRATION RATE: 18 BRPM | SYSTOLIC BLOOD PRESSURE: 128 MMHG | DIASTOLIC BLOOD PRESSURE: 88 MMHG

## 2022-06-06 LAB
ALBUMIN SERPL ELPH-MCNC: 3.8 G/DL — SIGNIFICANT CHANGE UP (ref 3.3–5)
ALP SERPL-CCNC: 73 U/L — SIGNIFICANT CHANGE UP (ref 40–120)
ALT FLD-CCNC: 40 U/L — SIGNIFICANT CHANGE UP (ref 10–45)
ANION GAP SERPL CALC-SCNC: 7 MMOL/L — SIGNIFICANT CHANGE UP (ref 5–17)
AST SERPL-CCNC: 22 U/L — SIGNIFICANT CHANGE UP (ref 10–40)
BASOPHILS # BLD AUTO: 0.09 K/UL — SIGNIFICANT CHANGE UP (ref 0–0.2)
BASOPHILS NFR BLD AUTO: 0.8 % — SIGNIFICANT CHANGE UP (ref 0–2)
BILIRUB SERPL-MCNC: 0.5 MG/DL — SIGNIFICANT CHANGE UP (ref 0.2–1.2)
BUN SERPL-MCNC: 13 MG/DL — SIGNIFICANT CHANGE UP (ref 7–23)
CALCIUM SERPL-MCNC: 8.8 MG/DL — SIGNIFICANT CHANGE UP (ref 8.4–10.5)
CHLORIDE SERPL-SCNC: 105 MMOL/L — SIGNIFICANT CHANGE UP (ref 96–108)
CO2 SERPL-SCNC: 29 MMOL/L — SIGNIFICANT CHANGE UP (ref 22–31)
CREAT SERPL-MCNC: 1.24 MG/DL — SIGNIFICANT CHANGE UP (ref 0.5–1.3)
D DIMER BLD IA.RAPID-MCNC: <150 NG/ML DDU — SIGNIFICANT CHANGE UP
EGFR: 71 ML/MIN/1.73M2 — SIGNIFICANT CHANGE UP
EOSINOPHIL # BLD AUTO: 0.38 K/UL — SIGNIFICANT CHANGE UP (ref 0–0.5)
EOSINOPHIL NFR BLD AUTO: 3.4 % — SIGNIFICANT CHANGE UP (ref 0–6)
GLUCOSE SERPL-MCNC: 116 MG/DL — HIGH (ref 70–99)
HCT VFR BLD CALC: 41.6 % — SIGNIFICANT CHANGE UP (ref 39–50)
HGB BLD-MCNC: 14.5 G/DL — SIGNIFICANT CHANGE UP (ref 13–17)
IMM GRANULOCYTES NFR BLD AUTO: 0.4 % — SIGNIFICANT CHANGE UP (ref 0–1.5)
LIDOCAIN IGE QN: 74 U/L — SIGNIFICANT CHANGE UP (ref 73–393)
LYMPHOCYTES # BLD AUTO: 1.6 K/UL — SIGNIFICANT CHANGE UP (ref 1–3.3)
LYMPHOCYTES # BLD AUTO: 14.3 % — SIGNIFICANT CHANGE UP (ref 13–44)
MCHC RBC-ENTMCNC: 31 PG — SIGNIFICANT CHANGE UP (ref 27–34)
MCHC RBC-ENTMCNC: 34.9 GM/DL — SIGNIFICANT CHANGE UP (ref 32–36)
MCV RBC AUTO: 89.1 FL — SIGNIFICANT CHANGE UP (ref 80–100)
MONOCYTES # BLD AUTO: 0.67 K/UL — SIGNIFICANT CHANGE UP (ref 0–0.9)
MONOCYTES NFR BLD AUTO: 6 % — SIGNIFICANT CHANGE UP (ref 2–14)
NEUTROPHILS # BLD AUTO: 8.42 K/UL — HIGH (ref 1.8–7.4)
NEUTROPHILS NFR BLD AUTO: 75.1 % — SIGNIFICANT CHANGE UP (ref 43–77)
NRBC # BLD: 0 /100 WBCS — SIGNIFICANT CHANGE UP (ref 0–0)
PLATELET # BLD AUTO: 249 K/UL — SIGNIFICANT CHANGE UP (ref 150–400)
POTASSIUM SERPL-MCNC: 3.6 MMOL/L — SIGNIFICANT CHANGE UP (ref 3.5–5.3)
POTASSIUM SERPL-SCNC: 3.6 MMOL/L — SIGNIFICANT CHANGE UP (ref 3.5–5.3)
PROT SERPL-MCNC: 7.1 G/DL — SIGNIFICANT CHANGE UP (ref 6–8.3)
RBC # BLD: 4.67 M/UL — SIGNIFICANT CHANGE UP (ref 4.2–5.8)
RBC # FLD: 12.6 % — SIGNIFICANT CHANGE UP (ref 10.3–14.5)
SODIUM SERPL-SCNC: 141 MMOL/L — SIGNIFICANT CHANGE UP (ref 135–145)
TROPONIN I, HIGH SENSITIVITY RESULT: 4.4 NG/L — SIGNIFICANT CHANGE UP
TROPONIN I, HIGH SENSITIVITY RESULT: <4 NG/L — SIGNIFICANT CHANGE UP
WBC # BLD: 11.2 K/UL — HIGH (ref 3.8–10.5)
WBC # FLD AUTO: 11.2 K/UL — HIGH (ref 3.8–10.5)

## 2022-06-06 PROCEDURE — 85379 FIBRIN DEGRADATION QUANT: CPT

## 2022-06-06 PROCEDURE — 85025 COMPLETE CBC W/AUTO DIFF WBC: CPT

## 2022-06-06 PROCEDURE — 93010 ELECTROCARDIOGRAM REPORT: CPT

## 2022-06-06 PROCEDURE — 93005 ELECTROCARDIOGRAM TRACING: CPT

## 2022-06-06 PROCEDURE — 71045 X-RAY EXAM CHEST 1 VIEW: CPT

## 2022-06-06 PROCEDURE — 99285 EMERGENCY DEPT VISIT HI MDM: CPT

## 2022-06-06 PROCEDURE — 80053 COMPREHEN METABOLIC PANEL: CPT

## 2022-06-06 PROCEDURE — 83690 ASSAY OF LIPASE: CPT

## 2022-06-06 PROCEDURE — 36415 COLL VENOUS BLD VENIPUNCTURE: CPT

## 2022-06-06 PROCEDURE — 99285 EMERGENCY DEPT VISIT HI MDM: CPT | Mod: 25

## 2022-06-06 PROCEDURE — 84484 ASSAY OF TROPONIN QUANT: CPT

## 2022-06-06 PROCEDURE — 71045 X-RAY EXAM CHEST 1 VIEW: CPT | Mod: 26

## 2022-06-06 RX ORDER — FAMOTIDINE 10 MG/ML
20 INJECTION INTRAVENOUS ONCE
Refills: 0 | Status: COMPLETED | OUTPATIENT
Start: 2022-06-06 | End: 2022-06-06

## 2022-06-06 RX ORDER — NITROGLYCERIN 6.5 MG
0.4 CAPSULE, EXTENDED RELEASE ORAL ONCE
Refills: 0 | Status: COMPLETED | OUTPATIENT
Start: 2022-06-06 | End: 2022-06-06

## 2022-06-06 RX ADMIN — FAMOTIDINE 200 MILLIGRAM(S): 10 INJECTION INTRAVENOUS at 05:18

## 2022-06-06 RX ADMIN — Medication 0.4 MILLIGRAM(S): at 03:33

## 2022-06-06 RX ADMIN — Medication 30 MILLILITER(S): at 05:18

## 2022-06-06 NOTE — ED PROVIDER NOTE - ENMT, MLM
Airway patent, Nasal mucosa clear. Mouth with normal mucosa. Throat has no vesicles, no oropharyngeal exudates and uvula is midline. Hx of fracture    No significant past surgical history

## 2022-06-06 NOTE — ED PROVIDER NOTE - OBJECTIVE STATEMENT
pt c/o mid sternal chest pain, tight, moderate, at 1am today, with radiation to anterior neck/jaw. no sob, n/v, sweats. no leg pain/swelling /recent periods of immobility.  no illegal drug use. no fever/chills/cough. at [employer] chicken tonight.  pt had covid 2 wk ago with only mild cough, resolved.

## 2022-06-06 NOTE — ED PROVIDER NOTE - NSFOLLOWUPINSTRUCTIONS_ED_ALL_ED_FT
Follow Up in 1-3 Days with your own doctor or with  [address] [address]  Phone: [phone]    Chest Pain    WHAT YOU NEED TO KNOW:    Chest pain can be caused by a range of conditions, from not serious to life-threatening. Chest pain can be a symptom of a digestive problem, such as acid reflux or a stomach ulcer. An anxiety attack or a strong emotion, such as anger, can also cause chest pain. Infection, inflammation, or a fracture in the bones or cartilage in your chest can cause pain or discomfort. Sometimes chest pain or pressure is caused by poor blood flow to your heart (angina). Chest pain may also be caused by life-threatening conditions such as a heart attack or blood clot in your lungs.     DISCHARGE INSTRUCTIONS:    Call 911 if:   You have any of the following signs of a heart attack:   Squeezing, pressure, or pain in your chest      You may also have any of the following:   Discomfort or pain in your back, neck, jaw, stomach, or arm    Shortness of breath    Nausea or vomiting    Lightheadedness or a sudden cold sweat    Return to the emergency department if:     You have chest discomfort that gets worse, even with medicine.    You cough or vomit blood.     Your bowel movements are black or bloody.     You cannot stop vomiting, or it hurts to swallow.     Contact your healthcare provider if:     You have questions or concerns about your condition or care.        Medicines:     Medicines may be given to treat the cause of your chest pain. Examples include pain medicine, anxiety medicine, or medicines to increase blood flow to your heart.       Do not take certain medicines without asking your healthcare provider first. These include NSAIDs, herbal or vitamin supplements, or hormones (estrogen or progestin).       Take your medicine as directed. Contact your healthcare provider if you think your medicine is not helping or if you have side effects. Tell him or her if you are allergic to any medicine. Keep a list of the medicines, vitamins, and herbs you take. Include the amounts, and when and why you take them. Bring the list or the pill bottles to follow-up visits. Carry your medicine list with you in case of an emergency.    Follow up with your healthcare provider within 72 hours, or as directed: You may need to return for more tests to find the cause of your chest pain. You may be referred to a specialist, such as a cardiologist or gastroenterologist. Write down your questions so you remember to ask them during your visits.     Healthy living tips: The following are general healthy guidelines. If your chest pain is caused by a heart problem, your healthcare provider will give you specific guidelines to follow.    Do not smoke. Nicotine and other chemicals in cigarettes and cigars can cause lung and heart damage. Ask your healthcare provider for information if you currently smoke and need help to quit. E-cigarettes or smokeless tobacco still contain nicotine. Talk to your healthcare provider before you use these products.       Eat a variety of healthy, low-fat, low-salt foods. Healthy foods include fruits, vegetables, whole-grain breads, low-fat dairy products, beans, lean meats, and fish. Ask for more information about a heart healthy diet.    Drink plenty of water every day. Your body is made of mostly water. Water helps your body to control your temperature and blood pressure. Ask your healthcare provider how much water you should drink every day.    Ask about activity. Your healthcare provider will tell you which activities to limit or avoid. Ask when you can drive, return to work, and have sex. Ask about the best exercise plan for you.    Maintain a healthy weight. Ask your healthcare provider how much you should weigh. Ask him or her to help you create a weight loss plan if you are overweight.     Get the flu and pneumonia vaccines. All adults should get the influenza (flu) vaccine. Get it every year as soon as it becomes available. The pneumococcal vaccine is given to adults aged 65 years or older. The vaccine is given every 5 years to prevent pneumococcal disease, such as pneumonia.    If you have a stent:   Carry your stent card with you at all times.   Let all healthcare providers know that you have a stent.

## 2022-06-06 NOTE — ED PROVIDER NOTE - CLINICAL SUMMARY MEDICAL DECISION MAKING FREE TEXT BOX
47 yo M p/w chest/neck /jaw pain tonight, recent covid infx. . ekg normal. vss, normal. will check labs, trop, ddimer, cxr. r/o ACS. r/o PE, pna, possible gerd/gastritis. 47 yo M p/w chest/neck /jaw pain tonight, recent covid infx. . ekg normal. vss, normal. will check labs, trop, ddimer, cxr. r/o ACS. r/o PE, pna, possible gerd/gastritis.    no change after NTG.  pt feels significantly better after maalox, pepcid.    trop x 2 neg, flat. no pain. f/u pmd/cardiology

## 2022-06-06 NOTE — ED PROVIDER NOTE - PATIENT PORTAL LINK FT
You can access the FollowMyHealth Patient Portal offered by [facility] by registering at the following website: [email or web address]. By joining [employer]’s FollowMyHealth portal, you will also be able to view your health information using other applications (apps) compatible with our system.

## 2022-06-06 NOTE — ED PROVIDER NOTE - EYES, MLM
Discharge instructions including the importance of hydration, the use of OTC medications, information on 1. Injury of right ankle, initial encounter      2. Contusion of right knee, initial encounter     and the proper follow up recommendations have been provided. Verbalizes understanding.  Confirms all questions have been answered.  A copy of the discharge instructions have been provided.  A signed copy is in the chart.  All pertinent medications reviewed.   Child out of department; pt in NAD, awake, alert, interactive and age appropriate      Clear bilaterally, pupils equal, round and reactive to light.

## 2022-06-06 NOTE — ED PROVIDER NOTE - CARE PROVIDER_API CALL
Petr Yeager  CARDIOLOGY  [address] [facility], [address]  [place] [id number]  Phone: [phone]  Fax: [phone]  Follow Up Time: 1-3 Days

## 2022-06-06 NOTE — ED PROVIDER NOTE - CARDIAC, MLM
Normal rate, regular rhythm.  Heart sounds S1, S2.  No murmurs, rubs or gallops. nontender chest wall. 2+ rad pulses. no leg edema

## 2022-06-06 NOTE — ED ADULT TRIAGE NOTE - CHIEF COMPLAINT QUOTE
c/o chest pain with associated left jaw pain and headache x1 hour prior to arrival. Pt. denies cardiac hx., numbness/tingling, SOB, N/V or visual changes.

## 2022-06-06 NOTE — ED ADULT TRIAGE NOTE - CCCP TRG CHIEF CMPLNT
PHYSICIAN NEXT STEPS:   Review Only      CHIEF COMPLAINT:   Chief Complaint/Protocol Used: Diarrhea   Onset: 04/29/2018         ASSESSMENT:   Â» Onset: 04/29/2018   Â» Diarrhea Severity: Moderate   Â» Onset: 04/29/2019   Â» Bm Consistency: Watery green   Â» Abdominal Pain: Crampy   Â» Abdominal Pain Severity: Mild   Â» Oral Intake: Gaterated 4.5 liters   Â» Exposure: Reduced chicken Pizza   Â» Other Symptoms: Leg cramps/ light headed   -------------------------------------------------------      DISPOSITION:   Disposition Recommendation: See Physician within 24 Hours   Questions that led to disposition:   Â» [1] SEVERE diarrhea (e.g., 7 or more times / day more than normal) AND [2] present > 24 hours (1 day)   Patient Directed To: Unspecified   Patient Intended Action: Seek care in the doctor's office          CALL NOTES:   05/04/2018 at 8:37 AM by Britany ROONEY» Appointment made for patient at [facility] with Dr. Francy Vasquez on 05/04/2018 at 1030.05/04/2018 at 8:34 AM by Britany Kincaid      DISPOSITION OVERRIDE/PROVIDER CONSULT:   Disposition Override: N/A   Override Source: Unspecified   Consulted with PCP: No   Consulted with On-Call Physician: No         CALLER CONTACT INFO:   Name: SEBASTIEN GARVEY (Self)   Phone 1: [phone] (Home)   Phone 2: [phone] (Cell)   Phone 3: [phone] - Preferred         ENCOUNTER STARTED:   05/04/18 08:16:38 AM   ENCOUNTER ASSIGNED TO/CLOSED BY:   Britany Kincaid @ 05/04/18 08:38:43 AM         -------------------------------------------------------      CARE ADVICE given per Diarrhea guideline.   OTC MEDS - Bismuth Subsalicylate (e.g., Kaopectate, Pepto-Bismol):    * Helps reduce diarrhea, vomiting, and abdominal cramping.   * Adult dosage: two tablets or two tablespoons by mouth every hour (if diarrhea continues) to a maximum of 8 doses in a 24 hour period.   * Do not use for more than 2 days.; CALL BACK IF:     * Signs of dehydration occur (e.g., no urine over 12 hours, very dry  mouth, lightheaded, etc.)    * Bloody stools    * Constant or severe abdominal pain    * You become worse.; FOOD AND NUTRITION DURING SEVERE DIARRHEA:    * Drinking enough liquids is more important that eating when one has severe diarrhea.   * As the diarrhea starts to get better, you can slowly return to a normal diet.   * Begin with boiled starches / cereals (e.g., potatoes, rice, noodles, wheat, oats) with a small amount of salt to taste.   * Other foods that are OK include: bananas, yogurt, crackers, soup.; SEE PHYSICIAN WITHIN 24 HOURS:    * IF OFFICE WILL BE OPEN: You need to be seen within the next 24 hours. Call your doctor when the office opens, and make an appointment.   * IF OFFICE WILL BE CLOSED AND NO PCP TRIAGE: You need to be seen within the next 24 hours. An urgent care center is often a good source of care if your doctor's office is closed.   * IF OFFICE WILL BE CLOSED AND PCP TRIAGE REQUIRED: You may need to be seen within the next 24 hours. Your doctor will want to talk with you to decide what's best. I'll page the doctor now. NOTE: Since this isn't serious, hold the page between 10 pm and 7 am. Page the doctor in the morning.   * IF PATIENT HAS NO PCP: Refer patient to an Urgent Care Center or Retail Clinic. Also try to help caller find a PCP (medical home) for their future care.         UNDERSTANDS CARE ADVICE: Yes      AGREES WITH CARE ADVICE: Yes      WILL FOLLOW CARE ADVICE: Yes      -------------------------------------------------------   chest pain

## 2022-11-23 NOTE — ED ADULT NURSE NOTE - NSPATIENTFLAG_GEN_A_ER
[Normal S1, S2] : normal S1 and S2 [Normal] : normal gait, coordination grossly intact, no focal deficits and deep tendon reflexes were 2+ and symmetric [de-identified] : Rapid atrial fibrillation irregular proximal 140 heartbeat Purple DH (Discharge Huddle; Vulnerable Patient)

## 2023-08-08 ENCOUNTER — OUTPATIENT (OUTPATIENT)
Dept: OUTPATIENT SERVICES | Facility: HOSPITAL | Age: 48
LOS: 1 days | End: 2023-08-08
Payer: COMMERCIAL

## 2023-08-08 VITALS
HEART RATE: 84 BPM | RESPIRATION RATE: 18 BRPM | WEIGHT: 229.94 LBS | HEIGHT: 72.5 IN | DIASTOLIC BLOOD PRESSURE: 76 MMHG | SYSTOLIC BLOOD PRESSURE: 126 MMHG | TEMPERATURE: 98 F | OXYGEN SATURATION: 98 %

## 2023-08-08 DIAGNOSIS — F41.1 GENERALIZED ANXIETY DISORDER: ICD-10-CM

## 2023-08-08 DIAGNOSIS — Z87.19 PERSONAL HISTORY OF OTHER DISEASES OF THE DIGESTIVE SYSTEM: Chronic | ICD-10-CM

## 2023-08-08 DIAGNOSIS — Z88.9 ALLERGY STATUS TO UNSPECIFIED DRUGS, MEDICAMENTS AND BIOLOGICAL SUBSTANCES: ICD-10-CM

## 2023-08-08 DIAGNOSIS — Z87.81 PERSONAL HISTORY OF (HEALED) TRAUMATIC FRACTURE: Chronic | ICD-10-CM

## 2023-08-08 DIAGNOSIS — Z98.890 OTHER SPECIFIED POSTPROCEDURAL STATES: Chronic | ICD-10-CM

## 2023-08-08 DIAGNOSIS — Z01.818 ENCOUNTER FOR OTHER PREPROCEDURAL EXAMINATION: ICD-10-CM

## 2023-08-08 LAB
ANION GAP SERPL CALC-SCNC: 12 MMOL/L — SIGNIFICANT CHANGE UP (ref 5–17)
BUN SERPL-MCNC: 11 MG/DL — SIGNIFICANT CHANGE UP (ref 7–23)
CALCIUM SERPL-MCNC: 9.4 MG/DL — SIGNIFICANT CHANGE UP (ref 8.4–10.5)
CHLORIDE SERPL-SCNC: 102 MMOL/L — SIGNIFICANT CHANGE UP (ref 96–108)
CO2 SERPL-SCNC: 24 MMOL/L — SIGNIFICANT CHANGE UP (ref 22–31)
CREAT SERPL-MCNC: 0.93 MG/DL — SIGNIFICANT CHANGE UP (ref 0.5–1.3)
EGFR: 101 ML/MIN/1.73M2 — SIGNIFICANT CHANGE UP
GLUCOSE SERPL-MCNC: 83 MG/DL — SIGNIFICANT CHANGE UP (ref 70–99)
HCT VFR BLD CALC: 44.8 % — SIGNIFICANT CHANGE UP (ref 39–50)
HGB BLD-MCNC: 15.2 G/DL — SIGNIFICANT CHANGE UP (ref 13–17)
MCHC RBC-ENTMCNC: 30 PG — SIGNIFICANT CHANGE UP (ref 27–34)
MCHC RBC-ENTMCNC: 33.9 GM/DL — SIGNIFICANT CHANGE UP (ref 32–36)
MCV RBC AUTO: 88.5 FL — SIGNIFICANT CHANGE UP (ref 80–100)
NRBC # BLD: 0 /100 WBCS — SIGNIFICANT CHANGE UP (ref 0–0)
PLATELET # BLD AUTO: 254 K/UL — SIGNIFICANT CHANGE UP (ref 150–400)
POTASSIUM SERPL-MCNC: 3.9 MMOL/L — SIGNIFICANT CHANGE UP (ref 3.5–5.3)
POTASSIUM SERPL-SCNC: 3.9 MMOL/L — SIGNIFICANT CHANGE UP (ref 3.5–5.3)
RBC # BLD: 5.06 M/UL — SIGNIFICANT CHANGE UP (ref 4.2–5.8)
RBC # FLD: 12.4 % — SIGNIFICANT CHANGE UP (ref 10.3–14.5)
SODIUM SERPL-SCNC: 138 MMOL/L — SIGNIFICANT CHANGE UP (ref 135–145)
WBC # BLD: 5.77 K/UL — SIGNIFICANT CHANGE UP (ref 3.8–10.5)
WBC # FLD AUTO: 5.77 K/UL — SIGNIFICANT CHANGE UP (ref 3.8–10.5)

## 2023-08-08 PROCEDURE — 85027 COMPLETE CBC AUTOMATED: CPT

## 2023-08-08 PROCEDURE — 80048 BASIC METABOLIC PNL TOTAL CA: CPT

## 2023-08-08 PROCEDURE — G0463: CPT
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
## 2023-08-08 NOTE — H&P PST ADULT - NSICDXFAMILYHX_GEN_ALL_CORE_FT
FAMILY HISTORY:  Father  Still living? Yes, Estimated age: Age Unknown  Family history of CHF (congestive heart failure), Age at diagnosis: Age Unknown    Mother  Still living? Yes, Estimated age: Age Unknown  Family history of stroke, Age at diagnosis: Age Unknown    Child  Still living? No  Family history of leukemia, Age at diagnosis: Age Unknown

## 2023-08-08 NOTE — H&P PST ADULT - PROBLEM SELECTOR PLAN 1
Pt is scheduled for dental work on 8/25/2023. Pt is managed by PCP who specializes in Cards- note will be sent to [facility].

## 2023-08-08 NOTE — H&P PST ADULT - PROBLEM SELECTOR PLAN 2
Pt w/ history of adverse reaction to -James meds which resulted in bradycardia/syncopal episodes. To be avoided.

## 2023-08-08 NOTE — H&P PST ADULT - NSICDXPASTMEDICALHX_GEN_ALL_CORE_FT
PAST MEDICAL HISTORY:  2019 novel coronavirus disease (COVID-19)     ADD (attention deficit disorder)     Drug allergy     History of dental abscess

## 2023-08-08 NOTE — H&P PST ADULT - NSICDXPASTSURGICALHX_GEN_ALL_CORE_FT
PAST SURGICAL HISTORY:  History of colonoscopy     History of dental abscess     History of fracture of clavicle     History of fracture of left ankle     History of umbilical hernia repair

## 2023-08-08 NOTE — H&P PST ADULT - NSICDXPROCEDURE_GEN_ALL_CORE_FT
Dr Esha Vergara notified . Humalog 10 units given SQ. No further orders received. PROCEDURES:  Extraction, tooth, with dental implant insertion 08-Aug-2023 15:34:43  Jaymie Alonso

## 2023-08-08 NOTE — H&P PST ADULT - ASSESSMENT
Here for some dental work. As per pt one tooth is broken but denies any loose/removable teeth  Mallampati I

## 2023-08-24 ENCOUNTER — TRANSCRIPTION ENCOUNTER (OUTPATIENT)
Age: 48
End: 2023-08-24

## 2023-08-25 ENCOUNTER — OUTPATIENT (OUTPATIENT)
Dept: INPATIENT UNIT | Facility: HOSPITAL | Age: 48
LOS: 1 days | End: 2023-08-25
Payer: COMMERCIAL

## 2023-08-25 ENCOUNTER — TRANSCRIPTION ENCOUNTER (OUTPATIENT)
Age: 48
End: 2023-08-25

## 2023-08-25 VITALS
OXYGEN SATURATION: 97 % | TEMPERATURE: 98 F | HEIGHT: 72.5 IN | HEART RATE: 76 BPM | RESPIRATION RATE: 16 BRPM | DIASTOLIC BLOOD PRESSURE: 88 MMHG | WEIGHT: 229.94 LBS | SYSTOLIC BLOOD PRESSURE: 130 MMHG

## 2023-08-25 VITALS
TEMPERATURE: 98 F | SYSTOLIC BLOOD PRESSURE: 131 MMHG | RESPIRATION RATE: 14 BRPM | DIASTOLIC BLOOD PRESSURE: 78 MMHG | HEART RATE: 82 BPM | OXYGEN SATURATION: 96 %

## 2023-08-25 DIAGNOSIS — Z87.81 PERSONAL HISTORY OF (HEALED) TRAUMATIC FRACTURE: Chronic | ICD-10-CM

## 2023-08-25 DIAGNOSIS — K02.9 DENTAL CARIES, UNSPECIFIED: ICD-10-CM

## 2023-08-25 DIAGNOSIS — Z98.890 OTHER SPECIFIED POSTPROCEDURAL STATES: Chronic | ICD-10-CM

## 2023-08-25 DIAGNOSIS — Z87.19 PERSONAL HISTORY OF OTHER DISEASES OF THE DIGESTIVE SYSTEM: Chronic | ICD-10-CM

## 2023-08-25 DIAGNOSIS — F41.1 GENERALIZED ANXIETY DISORDER: ICD-10-CM

## 2023-08-25 PROCEDURE — D2331: CPT

## 2023-08-25 PROCEDURE — C1889: CPT

## 2023-08-25 PROCEDURE — D2392: CPT

## 2023-08-25 PROCEDURE — D6199: CPT

## 2023-08-25 PROCEDURE — D7140: CPT

## 2023-08-25 DEVICE — SURGIFOAM PAD 8CM X 12.5CM X 10MM (100)
Type: IMPLANTABLE DEVICE | Status: NON-FUNCTIONAL
Removed: 2023-08-25

## 2023-08-25 DEVICE — MEMBRANE BIOMEND EXND 30X40
Type: IMPLANTABLE DEVICE | Status: NON-FUNCTIONAL
Removed: 2023-08-25

## 2023-08-25 RX ORDER — HYDROMORPHONE HYDROCHLORIDE 2 MG/ML
0.25 INJECTION INTRAMUSCULAR; INTRAVENOUS; SUBCUTANEOUS
Refills: 0 | Status: DISCONTINUED | OUTPATIENT
Start: 2023-08-25 | End: 2023-08-26

## 2023-08-25 RX ORDER — ONDANSETRON 8 MG/1
4 TABLET, FILM COATED ORAL ONCE
Refills: 0 | Status: DISCONTINUED | OUTPATIENT
Start: 2023-08-25 | End: 2023-08-26

## 2023-08-25 RX ORDER — CHLORHEXIDINE GLUCONATE 213 G/1000ML
15 SOLUTION TOPICAL
Qty: 1 | Refills: 0
Start: 2023-08-25

## 2023-08-25 RX ORDER — SODIUM CHLORIDE 9 MG/ML
3 INJECTION INTRAMUSCULAR; INTRAVENOUS; SUBCUTANEOUS EVERY 8 HOURS
Refills: 0 | Status: DISCONTINUED | OUTPATIENT
Start: 2023-08-25 | End: 2023-08-25

## 2023-08-25 RX ORDER — OXYCODONE HYDROCHLORIDE 5 MG/1
5 TABLET ORAL ONCE
Refills: 0 | Status: DISCONTINUED | OUTPATIENT
Start: 2023-08-25 | End: 2023-08-25

## 2023-08-25 RX ORDER — LISDEXAMFETAMINE DIMESYLATE 70 MG/1
1 CAPSULE ORAL
Refills: 0 | DISCHARGE

## 2023-08-25 RX ORDER — HYDROCODONE BITARTRATE AND ACETAMINOPHEN 7.5; 325 MG/15ML; MG/15ML
1 SOLUTION ORAL
Qty: 6 | Refills: 0
Start: 2023-08-25

## 2023-08-25 RX ORDER — AMOXICILLIN 250 MG/5ML
1 SUSPENSION, RECONSTITUTED, ORAL (ML) ORAL
Qty: 14 | Refills: 0
Start: 2023-08-25 | End: 2023-08-31

## 2023-08-25 RX ORDER — IBUPROFEN 200 MG
1 TABLET ORAL
Qty: 28 | Refills: 0
Start: 2023-08-25 | End: 2023-08-31

## 2023-08-25 RX ADMIN — OXYCODONE HYDROCHLORIDE 5 MILLIGRAM(S): 5 TABLET ORAL at 22:00

## 2023-08-25 RX ADMIN — OXYCODONE HYDROCHLORIDE 5 MILLIGRAM(S): 5 TABLET ORAL at 21:18

## 2023-08-25 NOTE — BRIEF OPERATIVE NOTE - NSICDXBRIEFPROCEDURE_GEN_ALL_CORE_FT
PROCEDURES:  Extraction of tooth 25-Aug-2023 19:10:58  Gregory Magana  Insertion, dental implant, osseointegrated, stage 1 25-Aug-2023 19:11:13  Gregory Magana

## 2023-08-25 NOTE — ASU PATIENT PROFILE, ADULT - MEDICATION HERBAL REMEDIES, PROFILE
ANTICOAGULATION MANAGEMENT     Oh Solares 72 year old male is on warfarin with therapeutic INR result. (Goal INR 2.0-3.0)    Recent labs: (last 7 days)     07/12/21  1158   INR 2.0*       ASSESSMENT     Source(s): Chart Review and Patient/Caregiver Call     Warfarin doses taken: Warfarin taken as instructed  Diet: No new diet changes identified  New illness, injury, or hospitalization: No  Medication/supplement changes: None noted  Signs or symptoms of bleeding or clotting: No  Previous INR: Supratherapeutic  Additional findings: None     PLAN     Recommended plan for no diet, medication or health factor changes affecting INR     Dosing Instructions: Continue your current warfarin dose with next INR in 4 weeks       Summary  As of 7/12/2021    Full warfarin instructions:  2.5 mg every Tue; 5 mg all other days   Next INR check:               Telephone call with Oh who verbalizes understanding and agrees to plan    Lab visit scheduled    Education provided: Please call back if any changes to your diet, medications or how you've been taking warfarin    Plan made per [facility] anticoagulation protocol    Ledy Mccall RN  Anticoagulation Clinic  7/12/2021    _______________________________________________________________________     Anticoagulation Episode Summary     Current INR goal:  2.0-3.0   TTR:  40.1 % (1 y)   Target end date:  Indefinite   Send INR reminders to:  JESSICA MOY    Indications    Coagulation defect (HCC) [D68.9] [D68.9]  Factor V Leiden mutation (H) [D68.51]  Long-term (current) use of anticoagulants [Z79.01] [Z79.01]           Comments:           Anticoagulation Care Providers     Provider Role Specialty Phone number    Karina Galvez PA-C Referring Family Medicine [phone]    Brendon Kay MD  Internal Medicine - Pediatrics [phone]          
no

## 2023-08-25 NOTE — ASU DISCHARGE PLAN (ADULT/PEDIATRIC) - PROVIDER TOKENS
FREE:[LAST:[Valeriy],FIRST:[Fabien],PHONE:[phone]],FAX:[(   )    -],ADDRESS:[[address], [address] [place] [address]],FOLLOWUP:[1 week],ESTABLISHEDPATIENT:[T]]

## 2023-08-25 NOTE — ASU PREOP CHECKLIST - BOWEL PREP
Patient finished antibiotics, and still having symptoms of headaches and pressure. Mom not sure if this may be a sinus infection or something more. n/a

## 2023-08-25 NOTE — BRIEF OPERATIVE NOTE - OPERATION/FINDINGS
Extraction of teeth #3 and #19. Placement of dental implants at sites #19 and #30. Guided bone regeneration of osseous defect with corticocancellous particulate allograft and membrane at extraction site #19. Surgical sites closed primarily with 4-0 Vicryl suture. Care handed over to General Dentistry for dental restorative treatment.

## 2023-08-25 NOTE — ASU DISCHARGE PLAN (ADULT/PEDIATRIC) - CARE PROVIDER_API CALL
Fabien Crooks  [facility]  [address], [address]  [place] [address]  Phone: [phone]  Fax: (   )    -  Established Patient  Follow Up Time: 1 week

## 2023-09-01 ENCOUNTER — APPOINTMENT (OUTPATIENT)
Age: 48
End: 2023-09-01
Payer: COMMERCIAL

## 2023-09-01 PROBLEM — Z87.19 PERSONAL HISTORY OF OTHER DISEASES OF THE DIGESTIVE SYSTEM: Chronic | Status: ACTIVE | Noted: 2023-08-08

## 2023-09-01 PROBLEM — U07.1 COVID-19: Chronic | Status: ACTIVE | Noted: 2023-08-08

## 2023-09-01 PROBLEM — F98.8 OTHER SPECIFIED BEHAVIORAL AND EMOTIONAL DISORDERS WITH ONSET USUALLY OCCURRING IN CHILDHOOD AND ADOLESCENCE: Chronic | Status: ACTIVE | Noted: 2023-08-08

## 2023-09-01 PROBLEM — F41.1 GENERALIZED ANXIETY DISORDER: Chronic | Status: ACTIVE | Noted: 2023-08-08

## 2023-09-01 PROBLEM — Z88.9 ALLERGY STATUS TO UNSPECIFIED DRUGS, MEDICAMENTS AND BIOLOGICAL SUBSTANCES: Chronic | Status: ACTIVE | Noted: 2023-08-08

## 2023-09-01 PROCEDURE — 99024 POSTOP FOLLOW-UP VISIT: CPT

## 2023-09-08 ENCOUNTER — APPOINTMENT (OUTPATIENT)
Age: 48
End: 2023-09-08
Payer: COMMERCIAL

## 2023-09-08 PROCEDURE — 99024 POSTOP FOLLOW-UP VISIT: CPT

## 2023-09-15 ENCOUNTER — APPOINTMENT (OUTPATIENT)
Age: 48
End: 2023-09-15
Payer: COMMERCIAL

## 2023-09-15 PROCEDURE — D0171: CPT

## 2023-11-11 ENCOUNTER — EMERGENCY (EMERGENCY)
Facility: HOSPITAL | Age: 48
LOS: 1 days | Discharge: ANOTHER HOSPITAL | End: 2023-11-11
Attending: STUDENT IN AN ORGANIZED HEALTH CARE EDUCATION/TRAINING PROGRAM | Admitting: STUDENT IN AN ORGANIZED HEALTH CARE EDUCATION/TRAINING PROGRAM
Payer: COMMERCIAL

## 2023-11-11 VITALS
WEIGHT: 229.94 LBS | RESPIRATION RATE: 19 BRPM | HEIGHT: 73 IN | SYSTOLIC BLOOD PRESSURE: 153 MMHG | TEMPERATURE: 98 F | OXYGEN SATURATION: 96 % | HEART RATE: 112 BPM | DIASTOLIC BLOOD PRESSURE: 94 MMHG

## 2023-11-11 DIAGNOSIS — Z98.890 OTHER SPECIFIED POSTPROCEDURAL STATES: Chronic | ICD-10-CM

## 2023-11-11 DIAGNOSIS — Z87.81 PERSONAL HISTORY OF (HEALED) TRAUMATIC FRACTURE: Chronic | ICD-10-CM

## 2023-11-11 DIAGNOSIS — Z87.19 PERSONAL HISTORY OF OTHER DISEASES OF THE DIGESTIVE SYSTEM: Chronic | ICD-10-CM

## 2023-11-11 LAB
BASOPHILS # BLD AUTO: 0.14 K/UL — SIGNIFICANT CHANGE UP (ref 0–0.2)
BASOPHILS # BLD AUTO: 0.14 K/UL — SIGNIFICANT CHANGE UP (ref 0–0.2)
BASOPHILS NFR BLD AUTO: 1.5 % — SIGNIFICANT CHANGE UP (ref 0–2)
BASOPHILS NFR BLD AUTO: 1.5 % — SIGNIFICANT CHANGE UP (ref 0–2)
EOSINOPHIL # BLD AUTO: 0.35 K/UL — SIGNIFICANT CHANGE UP (ref 0–0.5)
EOSINOPHIL # BLD AUTO: 0.35 K/UL — SIGNIFICANT CHANGE UP (ref 0–0.5)
EOSINOPHIL NFR BLD AUTO: 3.7 % — SIGNIFICANT CHANGE UP (ref 0–6)
EOSINOPHIL NFR BLD AUTO: 3.7 % — SIGNIFICANT CHANGE UP (ref 0–6)
HCT VFR BLD CALC: 41.3 % — SIGNIFICANT CHANGE UP (ref 39–50)
HCT VFR BLD CALC: 41.3 % — SIGNIFICANT CHANGE UP (ref 39–50)
HGB BLD-MCNC: 14.2 G/DL — SIGNIFICANT CHANGE UP (ref 13–17)
HGB BLD-MCNC: 14.2 G/DL — SIGNIFICANT CHANGE UP (ref 13–17)
IMM GRANULOCYTES NFR BLD AUTO: 0.3 % — SIGNIFICANT CHANGE UP (ref 0–0.9)
IMM GRANULOCYTES NFR BLD AUTO: 0.3 % — SIGNIFICANT CHANGE UP (ref 0–0.9)
LYMPHOCYTES # BLD AUTO: 3.65 K/UL — HIGH (ref 1–3.3)
LYMPHOCYTES # BLD AUTO: 3.65 K/UL — HIGH (ref 1–3.3)
LYMPHOCYTES # BLD AUTO: 38.4 % — SIGNIFICANT CHANGE UP (ref 13–44)
LYMPHOCYTES # BLD AUTO: 38.4 % — SIGNIFICANT CHANGE UP (ref 13–44)
MCHC RBC-ENTMCNC: 30.2 PG — SIGNIFICANT CHANGE UP (ref 27–34)
MCHC RBC-ENTMCNC: 30.2 PG — SIGNIFICANT CHANGE UP (ref 27–34)
MCHC RBC-ENTMCNC: 34.4 GM/DL — SIGNIFICANT CHANGE UP (ref 32–36)
MCHC RBC-ENTMCNC: 34.4 GM/DL — SIGNIFICANT CHANGE UP (ref 32–36)
MCV RBC AUTO: 87.9 FL — SIGNIFICANT CHANGE UP (ref 80–100)
MCV RBC AUTO: 87.9 FL — SIGNIFICANT CHANGE UP (ref 80–100)
MONOCYTES # BLD AUTO: 0.84 K/UL — SIGNIFICANT CHANGE UP (ref 0–0.9)
MONOCYTES # BLD AUTO: 0.84 K/UL — SIGNIFICANT CHANGE UP (ref 0–0.9)
MONOCYTES NFR BLD AUTO: 8.8 % — SIGNIFICANT CHANGE UP (ref 2–14)
MONOCYTES NFR BLD AUTO: 8.8 % — SIGNIFICANT CHANGE UP (ref 2–14)
NEUTROPHILS # BLD AUTO: 4.49 K/UL — SIGNIFICANT CHANGE UP (ref 1.8–7.4)
NEUTROPHILS # BLD AUTO: 4.49 K/UL — SIGNIFICANT CHANGE UP (ref 1.8–7.4)
NEUTROPHILS NFR BLD AUTO: 47.3 % — SIGNIFICANT CHANGE UP (ref 43–77)
NEUTROPHILS NFR BLD AUTO: 47.3 % — SIGNIFICANT CHANGE UP (ref 43–77)
NRBC # BLD: 0 /100 WBCS — SIGNIFICANT CHANGE UP (ref 0–0)
NRBC # BLD: 0 /100 WBCS — SIGNIFICANT CHANGE UP (ref 0–0)
PLATELET # BLD AUTO: 288 K/UL — SIGNIFICANT CHANGE UP (ref 150–400)
PLATELET # BLD AUTO: 288 K/UL — SIGNIFICANT CHANGE UP (ref 150–400)
RBC # BLD: 4.7 M/UL — SIGNIFICANT CHANGE UP (ref 4.2–5.8)
RBC # BLD: 4.7 M/UL — SIGNIFICANT CHANGE UP (ref 4.2–5.8)
RBC # FLD: 12.1 % — SIGNIFICANT CHANGE UP (ref 10.3–14.5)
RBC # FLD: 12.1 % — SIGNIFICANT CHANGE UP (ref 10.3–14.5)
WBC # BLD: 9.5 K/UL — SIGNIFICANT CHANGE UP (ref 3.8–10.5)
WBC # BLD: 9.5 K/UL — SIGNIFICANT CHANGE UP (ref 3.8–10.5)
WBC # FLD AUTO: 9.5 K/UL — SIGNIFICANT CHANGE UP (ref 3.8–10.5)
WBC # FLD AUTO: 9.5 K/UL — SIGNIFICANT CHANGE UP (ref 3.8–10.5)

## 2023-11-11 PROCEDURE — 99291 CRITICAL CARE FIRST HOUR: CPT

## 2023-11-11 RX ORDER — SODIUM CHLORIDE 9 MG/ML
1000 INJECTION INTRAMUSCULAR; INTRAVENOUS; SUBCUTANEOUS ONCE
Refills: 0 | Status: COMPLETED | OUTPATIENT
Start: 2023-11-11 | End: 2023-11-11

## 2023-11-11 RX ORDER — LAMOTRIGINE 25 MG/1
2 TABLET, ORALLY DISINTEGRATING ORAL
Refills: 0 | DISCHARGE

## 2023-11-11 RX ORDER — DEXTROAMPHETAMINE SACCHARATE, AMPHETAMINE ASPARTATE, DEXTROAMPHETAMINE SULFATE AND AMPHETAMINE SULFATE 1.875; 1.875; 1.875; 1.875 MG/1; MG/1; MG/1; MG/1
1 TABLET ORAL
Refills: 0 | DISCHARGE

## 2023-11-11 RX ORDER — MORPHINE SULFATE 50 MG/1
4 CAPSULE, EXTENDED RELEASE ORAL ONCE
Refills: 0 | Status: DISCONTINUED | OUTPATIENT
Start: 2023-11-11 | End: 2023-11-11

## 2023-11-11 RX ADMIN — SODIUM CHLORIDE 1000 MILLILITER(S): 9 INJECTION INTRAMUSCULAR; INTRAVENOUS; SUBCUTANEOUS at 23:44

## 2023-11-11 RX ADMIN — MORPHINE SULFATE 4 MILLIGRAM(S): 50 CAPSULE, EXTENDED RELEASE ORAL at 23:44

## 2023-11-12 VITALS
OXYGEN SATURATION: 98 % | TEMPERATURE: 98 F | DIASTOLIC BLOOD PRESSURE: 86 MMHG | SYSTOLIC BLOOD PRESSURE: 118 MMHG | HEART RATE: 91 BPM | RESPIRATION RATE: 16 BRPM

## 2023-11-12 LAB
ALBUMIN SERPL ELPH-MCNC: 3.8 G/DL — SIGNIFICANT CHANGE UP (ref 3.3–5)
ALBUMIN SERPL ELPH-MCNC: 3.8 G/DL — SIGNIFICANT CHANGE UP (ref 3.3–5)
ALP SERPL-CCNC: 80 U/L — SIGNIFICANT CHANGE UP (ref 40–120)
ALP SERPL-CCNC: 80 U/L — SIGNIFICANT CHANGE UP (ref 40–120)
ALT FLD-CCNC: 33 U/L — SIGNIFICANT CHANGE UP (ref 10–45)
ALT FLD-CCNC: 33 U/L — SIGNIFICANT CHANGE UP (ref 10–45)
ANION GAP SERPL CALC-SCNC: 11 MMOL/L — SIGNIFICANT CHANGE UP (ref 5–17)
ANION GAP SERPL CALC-SCNC: 11 MMOL/L — SIGNIFICANT CHANGE UP (ref 5–17)
APTT BLD: 28.6 SEC — SIGNIFICANT CHANGE UP (ref 24.5–35.6)
APTT BLD: 28.6 SEC — SIGNIFICANT CHANGE UP (ref 24.5–35.6)
AST SERPL-CCNC: 26 U/L — SIGNIFICANT CHANGE UP (ref 10–40)
AST SERPL-CCNC: 26 U/L — SIGNIFICANT CHANGE UP (ref 10–40)
BILIRUB SERPL-MCNC: 0.4 MG/DL — SIGNIFICANT CHANGE UP (ref 0.2–1.2)
BILIRUB SERPL-MCNC: 0.4 MG/DL — SIGNIFICANT CHANGE UP (ref 0.2–1.2)
BUN SERPL-MCNC: 14 MG/DL — SIGNIFICANT CHANGE UP (ref 7–23)
BUN SERPL-MCNC: 14 MG/DL — SIGNIFICANT CHANGE UP (ref 7–23)
CALCIUM SERPL-MCNC: 9.5 MG/DL — SIGNIFICANT CHANGE UP (ref 8.4–10.5)
CALCIUM SERPL-MCNC: 9.5 MG/DL — SIGNIFICANT CHANGE UP (ref 8.4–10.5)
CHLORIDE SERPL-SCNC: 101 MMOL/L — SIGNIFICANT CHANGE UP (ref 96–108)
CHLORIDE SERPL-SCNC: 101 MMOL/L — SIGNIFICANT CHANGE UP (ref 96–108)
CK SERPL-CCNC: 232 U/L — HIGH (ref 30–200)
CK SERPL-CCNC: 232 U/L — HIGH (ref 30–200)
CO2 SERPL-SCNC: 25 MMOL/L — SIGNIFICANT CHANGE UP (ref 22–31)
CO2 SERPL-SCNC: 25 MMOL/L — SIGNIFICANT CHANGE UP (ref 22–31)
CREAT SERPL-MCNC: 1.28 MG/DL — SIGNIFICANT CHANGE UP (ref 0.5–1.3)
CREAT SERPL-MCNC: 1.28 MG/DL — SIGNIFICANT CHANGE UP (ref 0.5–1.3)
EGFR: 69 ML/MIN/1.73M2 — SIGNIFICANT CHANGE UP
EGFR: 69 ML/MIN/1.73M2 — SIGNIFICANT CHANGE UP
GLUCOSE SERPL-MCNC: 106 MG/DL — HIGH (ref 70–99)
GLUCOSE SERPL-MCNC: 106 MG/DL — HIGH (ref 70–99)
INR BLD: 0.93 RATIO — SIGNIFICANT CHANGE UP (ref 0.85–1.18)
INR BLD: 0.93 RATIO — SIGNIFICANT CHANGE UP (ref 0.85–1.18)
MAGNESIUM SERPL-MCNC: 2 MG/DL — SIGNIFICANT CHANGE UP (ref 1.6–2.6)
MAGNESIUM SERPL-MCNC: 2 MG/DL — SIGNIFICANT CHANGE UP (ref 1.6–2.6)
POTASSIUM SERPL-MCNC: 3.4 MMOL/L — LOW (ref 3.5–5.3)
POTASSIUM SERPL-MCNC: 3.4 MMOL/L — LOW (ref 3.5–5.3)
POTASSIUM SERPL-SCNC: 3.4 MMOL/L — LOW (ref 3.5–5.3)
POTASSIUM SERPL-SCNC: 3.4 MMOL/L — LOW (ref 3.5–5.3)
PROT SERPL-MCNC: 7.3 G/DL — SIGNIFICANT CHANGE UP (ref 6–8.3)
PROT SERPL-MCNC: 7.3 G/DL — SIGNIFICANT CHANGE UP (ref 6–8.3)
PROTHROM AB SERPL-ACNC: 10.9 SEC — SIGNIFICANT CHANGE UP (ref 9.5–13)
PROTHROM AB SERPL-ACNC: 10.9 SEC — SIGNIFICANT CHANGE UP (ref 9.5–13)
SODIUM SERPL-SCNC: 137 MMOL/L — SIGNIFICANT CHANGE UP (ref 135–145)
SODIUM SERPL-SCNC: 137 MMOL/L — SIGNIFICANT CHANGE UP (ref 135–145)

## 2023-11-12 PROCEDURE — 82550 ASSAY OF CK (CPK): CPT

## 2023-11-12 PROCEDURE — 36415 COLL VENOUS BLD VENIPUNCTURE: CPT

## 2023-11-12 PROCEDURE — 96375 TX/PRO/DX INJ NEW DRUG ADDON: CPT

## 2023-11-12 PROCEDURE — 80053 COMPREHEN METABOLIC PANEL: CPT

## 2023-11-12 PROCEDURE — 96374 THER/PROPH/DIAG INJ IV PUSH: CPT

## 2023-11-12 PROCEDURE — 83735 ASSAY OF MAGNESIUM: CPT

## 2023-11-12 PROCEDURE — 85610 PROTHROMBIN TIME: CPT

## 2023-11-12 PROCEDURE — 99291 CRITICAL CARE FIRST HOUR: CPT | Mod: 25

## 2023-11-12 PROCEDURE — 90715 TDAP VACCINE 7 YRS/> IM: CPT

## 2023-11-12 PROCEDURE — 90471 IMMUNIZATION ADMIN: CPT

## 2023-11-12 PROCEDURE — 85730 THROMBOPLASTIN TIME PARTIAL: CPT

## 2023-11-12 PROCEDURE — 85025 COMPLETE CBC W/AUTO DIFF WBC: CPT

## 2023-11-12 PROCEDURE — 96361 HYDRATE IV INFUSION ADD-ON: CPT

## 2023-11-12 RX ORDER — TETANUS TOXOID, REDUCED DIPHTHERIA TOXOID AND ACELLULAR PERTUSSIS VACCINE, ADSORBED 5; 2.5; 8; 8; 2.5 [IU]/.5ML; [IU]/.5ML; UG/.5ML; UG/.5ML; UG/.5ML
0.5 SUSPENSION INTRAMUSCULAR ONCE
Refills: 0 | Status: COMPLETED | OUTPATIENT
Start: 2023-11-12 | End: 2023-11-12

## 2023-11-12 RX ORDER — ACETAMINOPHEN 500 MG
1000 TABLET ORAL ONCE
Refills: 0 | Status: COMPLETED | OUTPATIENT
Start: 2023-11-12 | End: 2023-11-12

## 2023-11-12 RX ADMIN — TETANUS TOXOID, REDUCED DIPHTHERIA TOXOID AND ACELLULAR PERTUSSIS VACCINE, ADSORBED 0.5 MILLILITER(S): 5; 2.5; 8; 8; 2.5 SUSPENSION INTRAMUSCULAR at 01:09

## 2023-11-12 RX ADMIN — Medication 400 MILLIGRAM(S): at 01:38

## 2023-11-12 RX ADMIN — SODIUM CHLORIDE 1000 MILLILITER(S): 9 INJECTION INTRAMUSCULAR; INTRAVENOUS; SUBCUTANEOUS at 00:44

## 2023-11-12 RX ADMIN — MORPHINE SULFATE 4 MILLIGRAM(S): 50 CAPSULE, EXTENDED RELEASE ORAL at 00:14

## 2023-11-12 RX ADMIN — Medication 1000 MILLIGRAM(S): at 02:14

## 2023-11-12 RX ADMIN — Medication 1000 MILLIGRAM(S): at 01:52
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
## 2023-11-12 NOTE — ED ADULT NURSE REASSESSMENT NOTE - NS ED NURSE REASSESS COMMENT FT1
IV placed on pt and blood drawn with no issues, upon injection of morphine, pt had a syncopal episode. MD made aware, pt placed on a cardiac monitor for observation

## 2023-11-12 NOTE — ED ADULT NURSE NOTE - NSFALLUNIVINTERV_ED_ALL_ED
Bed/Stretcher in lowest position, wheels locked, appropriate side rails in place/Call bell, personal items and telephone in reach/Instruct patient to call for assistance before getting out of bed/chair/stretcher/Non-slip footwear applied when patient is off stretcher/[place] to call system/Physically safe environment - no spills, clutter or unnecessary equipment/Purposeful proactive rounding/Room/bathroom lighting operational, light cord in reach
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
## 2024-03-08 NOTE — ED PROVIDER NOTE - PROGRESS NOTE
SUBJECTIVE:  I had the pleasure of seeing Yessenia Reis for cardiac follow up.      HPI:   Yessenia Reis is a 71 year old male with h/o new onset atrial flutter, KALPANA, RBBB, HTN, HLD, and DM Type II presents here today to f/u for cardiac issues. He was recently seen by  on 9/8/2023.   He has been doing well since the last visit. Denies of having chest pain, SOB, palpitations. Denies of waking up at night due to chest pain, SOB, palpitations. Denies of noticing swelling in his legs, no PND or orthopnea.   . Denies of feeling dizzy or lightheaded. He has been taking his medications daily. He has been tolerating eliquis, denies of noticing bloody/black stools.   Denies of any recent hospitalization. He e has been going to the gym and uses some weights and walks on a treadmill for an hour few times per week.    He previously underwent Right total knee arthroplasty at [facility] on 1/13/2023 with Dr. Xiao and was told that he had an irregular cardiac rhythm at the time when doing an EKG. He is unsure what the rhythm was. He was noted to have new onset atrial flutter. His echocardiogram (12/22/2022) shows normal LV size and function with EF of 65%. No significant valvular disease. Stress test showed a fixed defect. He underwent LHC on 7/24/2023 which did not show significant coronary artery disease.       REVIEW OF SYSTEMS:--General: Pt denies problems with fever, night sweats, weight changes, appetite changes and sleep problems  --HEET: Pt denies problems with head, ears, nose, mouth, throat and neck  --Respiratory: denies problems with coughing, wheezing, or changes in voice     Shortness of breath: present  --Cardiovascular:      Chest pain: present     Palpitations: present     PND: present     Edema of feet: present  --Gastrointestinal: Pt denies problems with bleeding, diarrhea, constipation, abdominal pain or other complaints  --Genitourinary: Pt denies problems with urinary pain, bleeding and voiding  Stable. problems  --Musculoskeletal: Pt denies problems with pain, swelling, weakness or limitation of motion  --Neurologic:Pt denies problems with seizures, paralysis, involuntary movements or gait     Dizziness:present  --Psychiatric: Pt denies problems with sleep, anxiety, or depression  --Hematologic/Lymphatic/Immunologic: Pt. denies hematological, lymphatic and immunological problems  --Endocrine: Pt. denies thyroid and diabetic problems  --Skin: No problems with scalp lesions. 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) 2014    Overview:  See colonoscopy and path from     Anemia     patient denies    Arthritis     elbow, neck,shoulder    Atrial flutter (CMD) 2023    Cyst, breast solitary, right     DM (diabetes mellitus) (CMD) 2019    Dysphagia     difficulty w/solid foods like meat-chews very well and no issues, pcp aware    Essential hypertension, benign 04/15/2006    Glaucoma     History of colonic polyps 2014    Hyperlipidemia 2016    Jaw pain     w/eating/swallowing    Nodular prostate with urinary obstruction 04/15/2006    Osteoarthritis of left knee     Sleep apnea 10/12/2010    CPAP  unknown setting    Spinal stenosis, lumbar region, without neurogenic claudication 2006     Past Surgical History:   Procedure Laterality Date    Colonoscopy      ademocarcinoma in polyp, has had colonoscopy every 2 years since and cancer free    Cyst removal  2023    Breast    Excision abdominal mass  2001    above kidney - benign    Fl knee injection  left Left     2012    Knee surgery Right 2023    knee replacement     Family History   Problem Relation Age of Onset    Heart Mother     Patient is unaware of any medical problems Father         86yrs    Heart Sister         obesity    Dementia/Alzheimers Brother         one borother has Parkinsons.    Heart Brother         2 brothers   of heart disease      Social History      Socioeconomic History    Marital status: /Civil Union     Spouse name: Not on file    Number of children: Not on file    Years of education: Not on file    Highest education level: Not on file   Occupational History    Not on file   Tobacco Use    Smoking status: Former     Current packs/day: 0.00     Average packs/day: 0.3 packs/day for 17.0 years (4.3 ttl pk-yrs)     Types: Cigarettes     Start date:      Quit date:      Years since quittin.2    Smokeless tobacco: Never    Tobacco comments:     Did not inhale only did for show   Vaping Use    Vaping Use: never used   Substance and Sexual Activity    Alcohol use: No    Drug use: Never    Sexual activity: Not Currently   Other Topics Concern    Not on file   Social History Narrative    Not on file     Social Determinants of Health     Financial Resource Strain: Not on file   Food Insecurity: Not on file   Transportation Needs: Not on file   Physical Activity: Not on file   Stress: Not on file   Social Connections: Not on file   Interpersonal Safety: Not on file       MEDICATIONS:   Current Outpatient Medications   Medication Sig Dispense Refill    atorvastatin (LIPITOR) 20 MG tablet TAKE 1 TABLET BY MOUTH EVERY DAY 90 tablet 1    flecainide (TAMBOCOR) 100 MG tablet TAKE 1 TABLET BY MOUTH TWICE A  tablet 1    Eliquis 5 MG Tab TAKE 1 TABLET BY MOUTH EVERY 12 HOURS 60 tablet 1    gemfibrozil (LOPID) 600 MG tablet TAKE 1 TABLET TWICE DAILY 180 tablet 3    latanoprost (XALATAN) 0.005 % ophthalmic solution INSTILL 1 DROP INTO BOTH EYES EVERY DAY AT NIGHT 2.5 mL 12    metoPROLOL succinate (TOPROL-XL) 100 MG 24 hr tablet TAKE 1 TABLET EVERY DAY 90 tablet 3    amLODIPine (NORVASC) 10 MG tablet TAKE 1 TABLET EVERY DAY 90 tablet 10    sulfamethoxazole-trimethoprim (BACTRIM DS) 800-160 MG per tablet Take 1 tablet by mouth 2 times daily. 42 tablet 0    meloxicam (MOBIC) 15 MG tablet Take 1 tablet by mouth daily. 14 tablet 0     HYDROcodone-acetaminophen (NORCO) 5-325 MG per tablet Take 1 tablet by mouth every 4 hours as needed for Pain. 40 tablet 0    gabapentin (NEURONTIN) 300 MG capsule Take 1 capsule by mouth at bedtime. 14 capsule 0    lisinopril (ZESTRIL) 40 MG tablet TAKE 1 TABLET EVERY DAY 90 tablet 1    Omega-3 Fatty Acids (Fish Oil) 1000 MG capsule Take 1 g by mouth 2 times daily.      doxazosin (CARDURA) 8 MG tablet Take 1 tablet by mouth nightly. 90 tablet 3    finasteride (PROSCAR) 5 MG tablet Take 1 tablet by mouth daily. 90 tablet 3    metFORMIN (GLUCOPHAGE) 500 MG tablet Take 1 tablet by mouth daily (with breakfast). 90 tablet 1    Blood Glucose Monitoring Suppl (True Metrix Air Glucose Meter) w/Device Kit 1 kit 3 times daily. Test: three times daily Diagnoses: DM Type II - without complication  E11.9 Insulin dependent: No 1 kit 1    blood glucose (True Metrix Blood Glucose Test) test strip Test: three times daily Diagnoses: DM Type II - without complication  E11.9 Insulin dependent: No 300 strip 3    Lancets (OneTouch Delica Plus [id number]) Misc 1 Units 3 times daily. Test: three times daily Diagnoses: DM Type II - without complication  E11.9 Insulin dependent: No 300 each 3    OneTouch Ultra test strip TEST DOS VECES AL INGA 200 strip 3    SOFTCLIX LANCETS Misc Test: once a daily Diagnoses: DM Type II - without complication  E11.9 Insulin dependent: No 100 each 3    blood glucose test strip One touch StripsTest: twice daily Diagnoses: DM Type II - without complication  E11.9 Insulin dependent: No 100 strip 3    Blood Glucose Monitoring Suppl (ONE TOUCH ULTRA 2) w/Device Kit Test 2X daily Dx DM TYPE II-UNCOMPL E11.9 Insulin:No One Touch Meter 1 kit 0    Blood Glucose Monitoring Suppl (ACCU-CHEK KOSTAS) Device [id number] Test 2X daily Dx DM TYPE II-UNCOMPL E11.9 Insulin:No 1 Device 0     No current facility-administered medications for this visit.         PHYSICAL EXAM:  There were no vitals taken for this visit.     This  is an alert and oriented to time, place, and person non-obese, well groomed and well-hydrated 71 year old male. Patient is in no acute distress.    HEENT: 3/3 carotid pulses bilateral symmetrical without bruit. No JVD seen at 90 degree. Conjunctiva white and non-jaundice. ZORAN. Oral mucosa is pink and moist.     Resp: Respiratory effort bilateral symmetrical with good expansion. Lungs are clear to auscultation. No wheezes and rhonchi auscultated.    CV:    PMI at 5th intercostal space mid-clavicular line.  Heart rate and rhythm are regular at  67 BPM. No murmur, S3, nor S4 auscultated. Normal S1 and S2.    ABD:  Soft, normal active bowel sound in all 4 quadrants, non-tender to palpation. No hepatomegaly or splenomegaly. No bruit over abdominal aorta.    EXT:  3/3 Femoral Artery pulses bilateral without bruit. no edema . No cyanosis, or clubbing noted. 3/3 pulses palpated at Dorsalis Pedis and posterior tibial artery bilaterally. No skin pigmentation or ulceration noted bilaterally.    Neuro: CN II-XII grossly intact. Gait is steady. Speech is fluent and coherent. Muscular strength 5/5 bilaterally in all 4 extremities.    Lab / Testing:    Nurse Only on 12/12/2023   Component Date Value Ref Range Status    BLDR Scan mLs 12/12/2023 215   Final   Office Visit on 10/26/2023   Component Date Value Ref Range Status    BLDR Scan mLs 10/26/2023 0ml   Final   Lab Services on 10/25/2023   Component Date Value Ref Range Status    Prostate Specific Antigen 10/25/2023 3.50  <=6.50 ng/mL Final   Admission on 08/22/2023, Discharged on 08/22/2023   Component Date Value Ref Range Status    GLUCOSE, BEDSIDE - POINT OF CARE 08/22/2023 139 (H)  70 - 99 mg/dL Final    Case Report 08/22/2023    Final                    Value:Surgical Pathology                                Case: [id number]                                  Authorizing Provider:  Jim Xiao MD     Collected:           08/22/2023 1032              Ordering  Location:     [facility]    Received:            08/22/2023 1250                                     MAIN OR                                                                      Pathologist:           Pako Morrison MD                                                                Specimen:    Knee, Left, left knee bones                                                                Pathologic Diagnosis 08/22/2023    Final                    Value:This result contains rich text formatting which cannot be displayed here.    Clinical Information 08/22/2023    Final                    Value:This result contains rich text formatting which cannot be displayed here.    Gross Description 08/22/2023    Final                    Value:This result contains rich text formatting which cannot be displayed here.    Disclaimer 08/22/2023    Final                    Value:This result contains rich text formatting which cannot be displayed here.    GLUCOSE, BEDSIDE - POINT OF CARE 08/22/2023 206 (H)  70 - 99 mg/dL Final    GLUCOSE, BEDSIDE - POINT OF CARE 08/22/2023 199 (H)  70 - 99 mg/dL Final    HGB 08/22/2023 13.5  13.0 - 17.0 g/dL Final    HCT 08/22/2023 40.1  39.0 - 51.0 % Final   Lab Services on 08/17/2023   Component Date Value Ref Range Status    Fasting Status 08/17/2023 11  0 - 999 Hours Final    Sodium 08/17/2023 143  135 - 145 mmol/L Final    Potassium 08/17/2023 4.2  3.4 - 5.1 mmol/L Final    Chloride 08/17/2023 107  97 - 110 mmol/L Final    Carbon Dioxide 08/17/2023 29  21 - 32 mmol/L Final    Anion Gap 08/17/2023 11  7 - 19 mmol/L Final    Glucose 08/17/2023 116 (H)  70 - 99 mg/dL Final    BUN 08/17/2023 15  6 - 20 mg/dL Final    Creatinine 08/17/2023 0.65 (L)  0.67 - 1.17 mg/dL Final    Glomerular Filtration Rate 08/17/2023 >90  >=60 Final    BUN/Cr 08/17/2023 23  7 - 25 Final    Calcium 08/17/2023 8.5  8.4 - 10.2 mg/dL Final    Bilirubin, Total 08/17/2023 0.5  0.2 - 1.0 mg/dL Final    GOT/AST 08/17/2023 12   <=37 Units/L Final    GPT/ALT 08/17/2023 16  <64 Units/L Final    Alkaline Phosphatase 08/17/2023 82  45 - 117 Units/L Final    Albumin 08/17/2023 3.8  3.6 - 5.1 g/dL Final    Protein, Total 08/17/2023 6.9  6.4 - 8.2 g/dL Final    Globulin 08/17/2023 3.1  2.0 - 4.0 g/dL Final    A/G Ratio 08/17/2023 1.2  1.0 - 2.4 Final    Hemoglobin A1C 08/17/2023 6.0 (H)  4.5 - 5.6 % Final    WBC 08/17/2023 3.9 (L)  4.2 - 11.0 K/mcL Final    RBC 08/17/2023 4.21 (L)  4.50 - 5.90 mil/mcL Final    HGB 08/17/2023 13.2  13.0 - 17.0 g/dL Final    HCT 08/17/2023 39.3  39.0 - 51.0 % Final    MCV 08/17/2023 93.3  78.0 - 100.0 fl Final    MCH 08/17/2023 31.4  26.0 - 34.0 pg Final    MCHC 08/17/2023 33.6  32.0 - 36.5 g/dL Final    PLT 08/17/2023 223  140 - 450 K/mcL Final    RDW-CV 08/17/2023 12.9  11.0 - 15.0 % Final    RDW-SD 08/17/2023 43.9  39.0 - 50.0 fL Final    NRBC 08/17/2023 0  <=0 /100 WBC Final   Lab Services on 07/06/2023   Component Date Value Ref Range Status    Sodium 07/06/2023 144  135 - 145 mmol/L Final    Potassium 07/06/2023 4.2  3.4 - 5.1 mmol/L Final    Chloride 07/06/2023 108  97 - 110 mmol/L Final    Carbon Dioxide 07/06/2023 30  21 - 32 mmol/L Final    Anion Gap 07/06/2023 10  7 - 19 mmol/L Final    Glucose 07/06/2023 120 (H)  70 - 99 mg/dL Final    BUN 07/06/2023 12  6 - 20 mg/dL Final    Creatinine 07/06/2023 0.65 (L)  0.67 - 1.17 mg/dL Final    Glomerular Filtration Rate 07/06/2023 >90  >=60 Final    BUN/Cr 07/06/2023 18  7 - 25 Final    Calcium 07/06/2023 9.3  8.4 - 10.2 mg/dL Final    Magnesium 07/06/2023 2.0  1.7 - 2.4 mg/dL Final    Protime- PT 07/06/2023 10.8  9.7 - 11.8 sec Final    INR 07/06/2023 1.1    Final    WBC 07/06/2023 3.5 (L)  4.2 - 11.0 K/mcL Final    RBC 07/06/2023 4.13 (L)  4.50 - 5.90 mil/mcL Final    HGB 07/06/2023 12.9 (L)  13.0 - 17.0 g/dL Final    HCT 07/06/2023 38.0 (L)  39.0 - 51.0 % Final    MCV 07/06/2023 92.0  78.0 - 100.0 fl Final    MCH 07/06/2023 31.2  26.0 - 34.0 pg Final    MCHC  07/06/2023 33.9  32.0 - 36.5 g/dL Final    RDW-CV 07/06/2023 13.8  11.0 - 15.0 % Final    RDW-SD 07/06/2023 47.3  39.0 - 50.0 fL Final    PLT 07/06/2023 207  140 - 450 K/mcL Final    Neutrophil, Percent 07/06/2023 60  % Final    Lymphocytes, Percent 07/06/2023 27  % Final    Mono, Percent 07/06/2023 8  % Final    Eosinophils, Percent 07/06/2023 4  % Final    Basophils, Percent 07/06/2023 1  % Final    Immature Granulocytes 07/06/2023 0  % Final    Absolute Neutrophils 07/06/2023 2.1  1.8 - 7.7 K/mcL Final    Absolute Lymphocytes 07/06/2023 0.9 (L)  1.0 - 4.0 K/mcL Final    Absolute Monocytes 07/06/2023 0.3  0.3 - 0.9 K/mcL Final    Absolute Eosinophils  07/06/2023 0.2  0.0 - 0.5 K/mcL Final    Absolute Basophils 07/06/2023 0.0  0.0 - 0.3 K/mcL Final    Absolute Immature Granulocytes 07/06/2023 0.0  0.0 - 0.2 K/mcL Final   Office Visit on 04/25/2023   Component Date Value Ref Range Status    BLDR Scan mLs 04/25/2023 4   Final   Admission on 04/12/2023, Discharged on 04/12/2023   Component Date Value Ref Range Status    GLUCOSE, BEDSIDE - POINT OF CARE 04/12/2023 107 (H)  70 - 99 mg/dL Final    Case Report 04/12/2023    Final                    Value:Surgical Pathology                                Case: [id number]                                  Authorizing Provider:  Osmar Foreman MD      Collected:           04/12/2023 1335              Ordering Location:     [facility]    Received:            04/12/2023 2685                                     MAIN OR                                                                      Pathologist:           Herminio Campuzano MD                                                          Specimen:    Breast, Right, RIGHT BREAST CYST                                                           Pathologic Diagnosis 04/12/2023    Final                    Value:This result contains rich text formatting which cannot be displayed here.    Clinical Information 04/12/2023     Final                    Value:This result contains rich text formatting which cannot be displayed here.    Gross Description 04/12/2023    Final                    Value:This result contains rich text formatting which cannot be displayed here.    Disclaimer 04/12/2023    Final                    Value:This result contains rich text formatting which cannot be displayed here.    GLUCOSE, BEDSIDE - POINT OF CARE 04/12/2023 118 (H)  70 - 99 mg/dL Final   Ancillary Procedure on 04/10/2023   Component Date Value Ref Range Status    Predicted HR Max 04/10/2023 148  BPM Final   Lab Services on 03/30/2023   Component Date Value Ref Range Status    Sodium 03/30/2023 144  135 - 145 mmol/L Final    Potassium 03/30/2023 3.9  3.4 - 5.1 mmol/L Final    Chloride 03/30/2023 108  97 - 110 mmol/L Final    Carbon Dioxide 03/30/2023 28  21 - 32 mmol/L Final    Anion Gap 03/30/2023 12  7 - 19 mmol/L Final    Glucose 03/30/2023 114 (H)  70 - 99 mg/dL Final    BUN 03/30/2023 17  6 - 20 mg/dL Final    Creatinine 03/30/2023 0.57 (L)  0.67 - 1.17 mg/dL Final    Glomerular Filtration Rate 03/30/2023 >90  >=60 Final    BUN/Cr 03/30/2023 30 (H)  7 - 25 Final    Calcium 03/30/2023 8.6  8.4 - 10.2 mg/dL Final    Bilirubin, Total 03/30/2023 0.4  0.2 - 1.0 mg/dL Final    GOT/AST 03/30/2023 12  <=37 Units/L Final    GPT/ALT 03/30/2023 19  <64 Units/L Final    Alkaline Phosphatase 03/30/2023 106  45 - 117 Units/L Final    Albumin 03/30/2023 3.9  3.6 - 5.1 g/dL Final    Protein, Total 03/30/2023 6.8  6.4 - 8.2 g/dL Final    Globulin 03/30/2023 2.9  2.0 - 4.0 g/dL Final    A/G Ratio 03/30/2023 1.3  1.0 - 2.4 Final    WBC 03/30/2023 4.4  4.2 - 11.0 K/mcL Final    RBC 03/30/2023 4.13 (L)  4.50 - 5.90 mil/mcL Final    HGB 03/30/2023 12.7 (L)  13.0 - 17.0 g/dL Final    HCT 03/30/2023 38.4 (L)  39.0 - 51.0 % Final    MCV 03/30/2023 93.0  78.0 - 100.0 fl Final    MCH 03/30/2023 30.8  26.0 - 34.0 pg Final    MCHC 03/30/2023 33.1  32.0 - 36.5 g/dL Final     RDW-CV 03/30/2023 13.2  11.0 - 15.0 % Final    RDW-SD 03/30/2023 44.8  39.0 - 50.0 fL Final    PLT 03/30/2023 219  140 - 450 K/mcL Final    NRBC 03/30/2023 0  <=0 /100 WBC Final    Neutrophil, Percent 03/30/2023 60  % Final    Lymphocytes, Percent 03/30/2023 27  % Final    Mono, Percent 03/30/2023 8  % Final    Eosinophils, Percent 03/30/2023 3  % Final    Basophils, Percent 03/30/2023 1  % Final    Immature Granulocytes 03/30/2023 1  % Final    Absolute Neutrophils 03/30/2023 2.7  1.8 - 7.7 K/mcL Final    Absolute Lymphocytes 03/30/2023 1.2  1.0 - 4.0 K/mcL Final    Absolute Monocytes 03/30/2023 0.4  0.3 - 0.9 K/mcL Final    Absolute Eosinophils  03/30/2023 0.1  0.0 - 0.5 K/mcL Final    Absolute Basophils 03/30/2023 0.0  0.0 - 0.3 K/mcL Final    Absolute Immature Granulocytes 03/30/2023 0.0  0.0 - 0.2 K/mcL Final   There may be more visits with results that are not included.       EKG today: NSR, HR 61    [facility] 7/24/2023  Coronary arteries:  Left main: Left main is a normal size vessel which trifurcates to the LAD, ramus intermedius and circumflex arteries.  The left main is free of any coronary artery disease  Left anterior descending: Gives rise to 1 diagonal branch.  The left anterior descending and its branches have minor luminal irregularities  Left circumflex: The LCx is a moderate-sized vessel giving rise to 2 marginal branches.  The left circumflex and its branches have minor luminal irregularities.  Ramus intermedius: Is a large vessel supplying much of the OM territory.  The ramus intermedius is free of any coronary artery disease.  Right coronary: The RCA is a size vessel giving rise to a posterolateral branch and a posterior descending artery making it anatomically dominant.  The RCA and its branches are free of any coronary artery disease    Stress test 4/2023  1. Abnormal myocardial perfusion examination.   2. The overall quality of the study is good.  3. There is a medium fixed perfusion defect of mild  severity involving the basal/mid inferior wall and apical cap wall(s) (predominantly segments 4, 10 and 17).  Electrocardiogram (3/31/2023) atrial flutter, RBBB, HR: 76 bpm  Electrocardiogram (3/30/2023) new onset atrial flutter, RBBB, HR: 75 bpm  Echocardiogram (12/22/2022)  1. Left ventricle: The cavity size is normal. Wall thickness is mildly increased. Systolic function is normal. The ejection fraction was measured by biplane method of disks. The ejection fraction is 65%.  2. Aortic root: The root is borderline dilated and 3.9 cm diameter.  3. Ascending aorta: The vessel is borderline dilated and 3.7 cm diameter.  4. Right ventricle: The cavity size is normal. Wall thickness is normal. Systolic function is normal.  Electrocardiogram (12/19/2022) sinus rhythm, occasional PVC, RBBB, HR: 62 bpm  Stress test (1/13/2014)  1. Normal myocardial perfusion examination.   2. The overall quality of the study is good.   3. Normal perfusion imaging without evidence of inducible ischemia or infarct.   4. Normal left ventricular volume with normal systolic thickening and function and an ejection fraction of 59%.   5. No previous study was available for comparison.   Carotid duplex-NA  Coronary angiography-NA      ASSESSMENT/PLAN:     1. Atrial flutter, [id number] score 3 (age,HTN, DM2)  EKG today NSR,RBBB HR 66   Continue metoprolol xl 50mg qd for rate control.  Patient is not sure if he is on 50 mg of metoprolol 100 mg?  He is asked to call BF with the dosing of his metoprolol.  Continue eliquis 5mg bid for CVA prophylaxis.  Continue flecainide 100mg bid.  S/p LHC 7/24/2023 no significant CAD.  Echocardiogram 12/2022 showed normal LVEF of 65%, no WMA or valvular heart disease.  Advised to reduce caffeine intake, ok to drink decaf coffee.  Increase daily hydration.    2. 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  RBBB    4. Hypertension, bp stable.  Continue torpol xl 50mg qd- takes 100mg?,amlodipine 10 mg daily , lisinopril 40mg qdand doxazosin 8 mg nightly due to his prostate.  Recommend low sodium diet of <2000 mg daily.    5. Hyperlipidemia  Continue atorvastatin 20 mg nightly  Continue gemfibrozil 600 mg BID  Low fat/low cholesterol diet recommended    6. DM Type II  Keep rigid control over your blood sugars.  CPM per PCP.    7. KALPANA on CPAP  Compliant.      Follow up with primary cardiologist,   in September.  Thank you for letting me be involved in the care of this pleasant patient.     Savanna Mccoy PA-C

## 2024-03-15 NOTE — ED ADULT TRIAGE NOTE - WEIGHT IN KG
INR therapeutic on current warfarin 7.5 Mon-Wed-Fri and 5 mg other days.  Repeat INR in 4 weeks.   104.3

## 2024-03-21 NOTE — ED ADULT NURSE NOTE - NS ED NOTE ABUSE RESPONSE YN
Dr. Walker has reviewed the patient's referral and chart. Please see his specific suggestions below:     Labs to obtain:   Brucella IgG (this order will pop up in EPIC)   Miscellaneous blood test send out to [facility] (Brucella antibodies IgG, IgM with reflex to Agglutination, Test Code: 01941)   Blood culture   HIV screening     Also if you can obtain records from when patient was previously diagnosed/treated to confirm the diagnosis of Brucellosis it is important. He suggests a Dermatology evaluation for this rash. Rash alone is non-specific and may be from many non-infectious etiologies.     If there is documented concern for infection based on obtained records or updated lab results, or high level of suspicion for an infectious rash from Dermatology we can then see patient in office.   If patient clinically worsens in mean time, may be best to send to ER for more emergent evaluation including labs and possible ID evaluation if felt indicated.     Please let us know if you have any questions regarding the above.   
Per ID request check Blood culture  
Yes

## 2024-09-11 NOTE — ED ADULT TRIAGE NOTE - ESI TRIAGE ACUITY LEVEL, MLM
Anesthesia Post-op Note    Patient: Moreno Bazan  Procedure(s) Performed: RECTAL EXAM UNDER ANESTHESIA AND HEMORRHOIDECTOMY (Anus)   Anesthesia type: General    Vitals Value Taken Time   Temp 37.4 09/11/24 1050   Pulse 56 09/11/24 1049   Resp 11 09/11/24 1049   SpO2 100 % 09/11/24 1049   /79 09/11/24 1046   Vitals shown include unfiled device data.      Patient Location: PACU Phase 1  Post-op Vital Signs:stable  Level of Consciousness: awake, alert and return to baseline  Respiratory Status: spontaneous ventilation  Cardiovascular stable  Hydration: euvolemic  Pain Management: adequately controlled  Handoff: Handoff to receiving clinician was performed and questions were answered  Vomiting: none  Nausea: None  Airway Patency:patent  Post-op Assessment: no complications and patient tolerated procedure well      No notable events documented.                       4
